# Patient Record
Sex: MALE | Race: WHITE | NOT HISPANIC OR LATINO | Employment: FULL TIME | URBAN - METROPOLITAN AREA
[De-identification: names, ages, dates, MRNs, and addresses within clinical notes are randomized per-mention and may not be internally consistent; named-entity substitution may affect disease eponyms.]

---

## 2017-12-04 DIAGNOSIS — E34.9 HYPOTESTOSTERONISM: ICD-10-CM

## 2017-12-04 RX ORDER — TESTOSTERONE CYPIONATE 1000 MG/10ML
75 INJECTION, SOLUTION INTRAMUSCULAR WEEKLY
Qty: 1 VIAL | Refills: 0 | Status: SHIPPED | OUTPATIENT
Start: 2017-12-04 | End: 2018-02-15

## 2017-12-04 NOTE — TELEPHONE ENCOUNTER
Routing refill request to provider for review/approval because:  Drug not on the [facility] refill protocol

## 2017-12-04 NOTE — TELEPHONE ENCOUNTER
Rx for testosterone faxed to [facility] in [place],message left for patient to return call. Last SIRISHA was on 11/28/16.

## 2018-02-15 ENCOUNTER — OFFICE VISIT (OUTPATIENT)
Dept: FAMILY MEDICINE | Facility: CLINIC | Age: 43
End: 2018-02-15
Payer: COMMERCIAL

## 2018-02-15 VITALS
HEART RATE: 66 BPM | SYSTOLIC BLOOD PRESSURE: 132 MMHG | HEIGHT: 67 IN | OXYGEN SATURATION: 97 % | WEIGHT: 203 LBS | DIASTOLIC BLOOD PRESSURE: 88 MMHG | RESPIRATION RATE: 16 BRPM | BODY MASS INDEX: 31.86 KG/M2

## 2018-02-15 DIAGNOSIS — Z00.01 ENCOUNTER FOR ROUTINE ADULT MEDICAL EXAM WITH ABNORMAL FINDINGS: Primary | ICD-10-CM

## 2018-02-15 DIAGNOSIS — E78.5 HYPERLIPIDEMIA LDL GOAL <130: ICD-10-CM

## 2018-02-15 DIAGNOSIS — E34.9 HYPOTESTOSTERONISM: ICD-10-CM

## 2018-02-15 DIAGNOSIS — Z92.241 HISTORY OF ANABOLIC STEROID USE: ICD-10-CM

## 2018-02-15 LAB
CHOLEST SERPL-MCNC: 199 MG/DL
HDLC SERPL-MCNC: 30 MG/DL
LDLC SERPL CALC-MCNC: 135 MG/DL
NONHDLC SERPL-MCNC: 169 MG/DL
TRIGL SERPL-MCNC: 168 MG/DL

## 2018-02-15 PROCEDURE — 84403 ASSAY OF TOTAL TESTOSTERONE: CPT | Performed by: PHYSICIAN ASSISTANT

## 2018-02-15 PROCEDURE — 80061 LIPID PANEL: CPT | Performed by: PHYSICIAN ASSISTANT

## 2018-02-15 PROCEDURE — 99396 PREV VISIT EST AGE 40-64: CPT | Performed by: PHYSICIAN ASSISTANT

## 2018-02-15 PROCEDURE — 36415 COLL VENOUS BLD VENIPUNCTURE: CPT | Performed by: PHYSICIAN ASSISTANT

## 2018-02-15 PROCEDURE — 84270 ASSAY OF SEX HORMONE GLOBUL: CPT | Performed by: PHYSICIAN ASSISTANT

## 2018-02-15 RX ORDER — TESTOSTERONE CYPIONATE 1000 MG/10ML
75 INJECTION, SOLUTION INTRAMUSCULAR WEEKLY
Qty: 1 VIAL | Refills: 3 | Status: SHIPPED | OUTPATIENT
Start: 2018-02-15 | End: 2018-02-18

## 2018-02-15 NOTE — MR AVS SNAPSHOT
After Visit Summary   2/15/2018    Neville Magallanes    MRN: [id number]           Patient Information     Date Of Birth          1975        Visit Information        Provider Department      2/15/2018 8:40 AM Haile Tam PA-C [facility]        Today's Diagnoses     Encounter for routine adult medical exam with abnormal findings    -  1    Hyperlipidemia LDL goal <130        Hypotestosteronism        History of anabolic steroid use          Care Instructions      Preventive Health Recommendations  Male Ages 40 to 49    Yearly exam:             See your health care provider every year in order to  o   Review health changes.   o   Discuss preventive care.    o   Review your medicines if your doctor has prescribed any.    You should be tested each year for STDs (sexually transmitted diseases) if you re at risk.     Have a cholesterol test every 5 years.     Have a colonoscopy (test for colon cancer) if someone in your family has had colon cancer or polyps before age 50.     After age 45, have a diabetes test (fasting glucose). If you are at risk for diabetes, you should have this test every 3 years.      Talk with your health care provider about whether or not a prostate cancer screening test (PSA) is right for you.    Shots: Get a flu shot each year. Get a tetanus shot every 10 years.     Nutrition:    Eat at least 5 servings of fruits and vegetables daily.     Eat whole-grain bread, whole-wheat pasta and brown rice instead of white grains and rice.     Talk to your provider about Calcium and Vitamin D.     Lifestyle    Exercise for at least 150 minutes a week (30 minutes a day, 5 days a week). This will help you control your weight and prevent disease.     Limit alcohol to one drink per day.     No smoking.     Wear sunscreen to prevent skin cancer.     See your dentist every six months for an exam and cleaning.              Follow-ups after your visit        Who to contact     Normal  "or non-critical lab and imaging results will be communicated to you by Celsensehart, letter or phone within 4 business days after the clinic has received the results. If you do not hear from us within 7 days, please contact the clinic through Smart[employer] or phone. If you have a critical or abnormal lab result, we will notify you by phone as soon as possible.  Submit refill requests through [employer] or call your pharmacy and they will forward the refill request to us. Please allow 3 business days for your refill to be completed.          If you need to speak with a  for additional information , please call: [phone]             Additional Information About Your Visit        [employer] Information     [employer] gives you secure access to your electronic health record. If you see a primary care provider, you can also send messages to your care team and make appointments. If you have questions, please call your primary care clinic.  If you do not have a primary care provider, please call [phone] and they will assist you.        Care EveryWhere ID     This is your Care EveryWhere ID. This could be used by other organizations to access your [place] medical records  [id number]        Your Vitals Were     Pulse Respirations Height Pulse Oximetry BMI (Body Mass Index)       66 16 5' 7\" (1.702 m) 97% 31.79 kg/m2        Blood Pressure from Last 3 Encounters:   02/15/18 132/88   12/07/16 126/78   11/28/16 137/87    Weight from Last 3 Encounters:   02/15/18 203 lb (92.1 kg)   12/07/16 194 lb 3.2 oz (88.1 kg)   11/28/16 205 lb (93 kg)              We Performed the Following     Lipid panel reflex to direct LDL Fasting     Testosterone Free and Total          Today's Medication Changes          These changes are accurate as of 2/15/18  9:43 AM.  If you have any questions, ask your nurse or doctor.               Stop taking these medicines if you haven't already. Please contact your care team if you have questions.  "    RA NICOTINE GUM MT   Stopped by:  Haile Tam PA-C           simvastatin 40 MG tablet   Commonly known as:  ZOCOR   Stopped by:  Haile Tam PA-C           valACYclovir 1000 mg tablet   Commonly known as:  VALTREX   Stopped by:  Haile Tam PA-C                Where to get your medicines      Some of these will need a paper prescription and others can be bought over the counter.  Ask your nurse if you have questions.     Bring a paper prescription for each of these medications     testosterone cypionate 100 MG/ML Soln inj                Primary Care Provider Office Phone # Fax #    Haile Tam PA-C [phone]       [address] [place] [address]        Equal Access to Services     [facility] FORD : Hadii aad ku hadasho Sokelleali, waaxda luqadaha, qaybta kaalmada adeegyada, waxay shell frias . So [facility] [phone].    ATENCIÓN: Si habla español, tiene a watson disposición servicios gratuitos de asistencia lingüística. [facility] [phone].    We comply with applicable federal civil rights laws and [place] laws. We do not discriminate on the basis of race, color, national origin, age, disability, sex, sexual orientation, or gender identity.            Thank you!     Thank you for choosing [facility]  for your care. Our goal is always to provide you with excellent care. Hearing back from our patients is one way we can continue to improve our services. Please take a few minutes to complete the written survey that you may receive in the mail after your visit with us. Thank you!             Your Updated Medication List - Protect others around you: Learn how to safely use, store and throw away your medicines at www.disposemymeds.org.          This list is accurate as of 2/15/18  9:43 AM.  Always use your most recent med list.                   Brand Name Dispense Instructions for use Diagnosis    testosterone cypionate 100 MG/ML Soln inj      1 vial    Inject 0.75 mLs (75 mg) into the muscle once a week    Hypotestosteronism

## 2018-02-15 NOTE — LETTER
[facility]  [address] [place] [address]  Phone: [phone]    February 15, 2018        Neville Magallanes  [address] [place] [address]          To whom it may concern:    RE: Neville Magallanes    Patient was seen and treated today at our clinic. I am recommending he utilize his flex spending and HSA dollars for a health club membership and martial arts training to assist in stress management as well as cardiac risk factor management. Additionally, I'd like him to use the money to purchase protein supplements to help reduce cholesterol and enhance lean tissue development. lastly, I have recommended chiropractic treatments and massage therapy for stress management.    Please contact me for questions or concerns.      Sincerely,        Haile Tam PA-C

## 2018-02-15 NOTE — PROGRESS NOTES
SUBJECTIVE:   CC: Neville Magallanes is an 42 year old male who presents for preventative health visit.     Healthy Habits:    Do you get at least three servings of calcium containing foods daily (dairy, green leafy vegetables, etc.)? yes    Amount of exercise or daily activities, outside of work: 7 day(s) per week    Problems taking medications regularly No    Medication side effects: No    Have you had an eye exam in the past two years? yes    Do you see a dentist twice per year? yes    Do you have sleep apnea, excessive snoring or daytime drowsiness?no           Today's PHQ-2 Score: No flowsheet data found.    Abuse: Current or Past(Physical, Sexual or Emotional)- No  Do you feel safe in your environment - Yes    Social History   Substance Use Topics     Smoking status: Former Smoker     Smokeless tobacco: Never Used     Alcohol use 0.0 oz/week     0 Standard drinks or equivalent per week      Comment: social      If you drink alcohol do you typically have >3 drinks per day or >7 drinks per week? No                      Last PSA: No results found for: PSA    Reviewed orders with patient. Reviewed health maintenance and updated orders accordingly - Yes  BP Readings from Last 3 Encounters:   02/15/18 132/88   12/07/16 126/78   11/28/16 137/87    Wt Readings from Last 3 Encounters:   02/15/18 203 lb (92.1 kg)   12/07/16 194 lb 3.2 oz (88.1 kg)   11/28/16 205 lb (93 kg)                    Reviewed and updated as needed this visit by clinical staff  Tobacco  Allergies  Meds  Problems         Reviewed and updated as needed this visit by Provider  Allergies  Meds  Problems        No past medical history on file.   Past Surgical History:   Procedure Laterality Date     ARTHROSCOPIC RECONSTRUCTION ANTERIOR CRUCIATE LIGAMENT Left 5/5/2016    Procedure: ARTHROSCOPIC RECONSTRUCTION ANTERIOR CRUCIATE LIGAMENT;  Surgeon: Casey Egan MD;  Location: [facility] OR       ROS:  C: NEGATIVE for fever, chills, change in weight  I:  "NEGATIVE for worrisome rashes, moles or lesions  E: NEGATIVE for vision changes or irritation  ENT: NEGATIVE for ear, mouth and throat problems  R: NEGATIVE for significant cough or SOB  CV: NEGATIVE for chest pain, palpitations or peripheral edema  GI: NEGATIVE for nausea, abdominal pain, heartburn, or change in bowel habits   male: negative for dysuria, hematuria, decreased urinary stream, erectile dysfunction, urethral discharge  M: NEGATIVE for significant arthralgias or myalgia  N: NEGATIVE for weakness, dizziness or paresthesias  P: NEGATIVE for changes in mood or affect    OBJECTIVE:   /88  Pulse 66  Resp 16  Ht 5' 7\" (1.702 m)  Wt 203 lb (92.1 kg)  SpO2 97%  BMI 31.79 kg/m2  EXAM:  GENERAL: healthy, alert and no distress  EYES: Eyes grossly normal to inspection, PERRL and conjunctivae and sclerae normal  HENT: ear canals and TM's normal, nose and mouth without ulcers or lesions  NECK: no adenopathy, no asymmetry, masses, or scars and thyroid normal to palpation  RESP: lungs clear to auscultation - no rales, rhonchi or wheezes  CV: regular rate and rhythm, normal S1 S2, no S3 or S4, no murmur, click or rub, no peripheral edema and peripheral pulses strong  ABDOMEN: soft, nontender, no hepatosplenomegaly, no masses and bowel sounds normal  MS: no gross musculoskeletal defects noted, no edema  SKIN: no suspicious lesions or rashes  NEURO: Normal strength and tone, mentation intact and speech normal  PSYCH: mentation appears normal, affect normal/bright    ASSESSMENT/PLAN:       ICD-10-CM    1. Encounter for routine adult medical exam with abnormal findings Z00.01 CANCELED: GLUCOSE   2. Hyperlipidemia LDL goal <130 E78.5 Lipid panel reflex to direct LDL Fasting   3. Hypotestosteronism E34.9 testosterone cypionate 100 MG/ML SOLN inj     Testosterone Free and Total   4. History of anabolic steroid use Z92.241        COUNSELING:  Reviewed preventive health counseling, as reflected in patient " "instructions       Regular exercise       Healthy diet/nutrition       reports that he has quit smoking. He has never used smokeless tobacco.    Estimated body mass index is 31.79 kg/(m^2) as calculated from the following:    Height as of this encounter: 5' 7\" (1.702 m).    Weight as of this encounter: 203 lb (92.1 kg).       Counseling Resources:  ATP IV Guidelines  Pooled Cohorts Equation Calculator  FRAX Risk Assessment  ICSI Preventive Guidelines  Dietary Guidelines for Americans, 2010  USDA's MyPlate  ASA Prophylaxis  Lung CA Screening    Haile Tam PA-C  [facility]  "

## 2018-02-16 LAB
SHBG SERPL-SCNC: 50 NMOL/L (ref 11–80)
TESTOST FREE SERPL-MCNC: 42.85 NG/DL (ref 4.7–24.4)
TESTOST SERPL-MCNC: 1867 NG/DL (ref 240–950)

## 2018-02-18 DIAGNOSIS — E34.9 HYPOTESTOSTERONISM: ICD-10-CM

## 2018-02-19 NOTE — TELEPHONE ENCOUNTER
Requested Prescriptions   Pending Prescriptions Disp Refills     testosterone cypionate 100 MG/ML SOLN inj [Pharmacy Med Name: Testosterone Cypionate Intramuscular Solution 100 MG/ML] 10 mL 0     Sig: INJECT 0.75 MLS (75 MGS.) INTO THE MUSCLE ONCE A WEEK    There is no refill protocol information for this order      Last Written Prescription Date:  12-4-17  Last Fill Quantity: 10,  # refills: 0   Last office visit: 2/15/2018 with prescribing provider:  2-15-18   Future Office Visit:

## 2018-02-20 RX ORDER — TESTOSTERONE CYPIONATE 1000 MG/10ML
INJECTION, SOLUTION INTRAMUSCULAR
Qty: 10 ML | Refills: 0 | Status: SHIPPED | OUTPATIENT
Start: 2018-02-20 | End: 2018-12-10

## 2018-12-10 DIAGNOSIS — E34.9 HYPOTESTOSTERONISM: ICD-10-CM

## 2018-12-10 NOTE — TELEPHONE ENCOUNTER
Requested Prescriptions   Pending Prescriptions Disp Refills     testosterone cypionate (DEPOTESTOSTERONE) 100 MG/ML injection [Pharmacy Med Name: Testosterone Cypionate Intramuscular Solution 100 MG/ML] 10 mL 2    Last Written Prescription Date:  7-6-18  Last Fill Quantity: 10,  # refills: 2   Last office visit: 2/15/2018 with prescribing provider:  2-15-18   Future Office Visit:   Sig: INJECT 0.75 MLS (75 MG) INTO THE MUSCLE ONCE A WEEK    Androgen Agents Failed - 12/10/2018 11:54 AM       Failed - ALT on file within past 12 mos    Recent Labs   Lab Test 12/07/16  0938   ALT 50            Failed - HCT less than 54% on file within past 12 mos    No lab results found.         Failed - Serum PSA on file within past 12 mos    No results found for: PSA         Failed - Refills for this classification require provider review       Failed - Blood pressure under 140/90 in past 6 months    BP Readings from Last 3 Encounters:   02/15/18 132/88   12/07/16 126/78   11/28/16 137/87                Failed - Recent (6 mo) or future (30 days) visit within the authorizing provider's specialty       Failed - AST on file within past 12 mos    Recent Labs   Lab Test 12/07/16  0938   AST 31            Passed - Patient is of age 12 and older       Passed - Lipid panel on file in past 12 mos    Recent Labs   Lab Test 02/15/18  0947   CHOL 199   TRIG 168*   HDL 30*   *   NHDL 169*              Passed - Medication is active on med list       Passed - Serum Testosterone on file within past 12 mos    Recent Labs   Lab Test 02/15/18  0947   TESTOSTTOTAL 1,867*            Passed - Patient is not pregnant       Passed - No positive pregnancy test on file within past 12 mos

## 2018-12-12 RX ORDER — TESTOSTERONE CYPIONATE 1000 MG/10ML
INJECTION, SOLUTION INTRAMUSCULAR
Qty: 10 ML | Refills: 2 | Status: SHIPPED | OUTPATIENT
Start: 2018-12-12 | End: 2019-10-24

## 2019-02-04 ENCOUNTER — OFFICE VISIT (OUTPATIENT)
Dept: ORTHOPEDICS | Facility: CLINIC | Age: 44
End: 2019-02-04
Payer: COMMERCIAL

## 2019-02-04 VITALS
HEIGHT: 68 IN | OXYGEN SATURATION: 95 % | WEIGHT: 211 LBS | RESPIRATION RATE: 16 BRPM | SYSTOLIC BLOOD PRESSURE: 143 MMHG | DIASTOLIC BLOOD PRESSURE: 90 MMHG | BODY MASS INDEX: 31.98 KG/M2 | HEART RATE: 93 BPM

## 2019-02-04 DIAGNOSIS — M25.561 ACUTE PAIN OF RIGHT KNEE: Primary | ICD-10-CM

## 2019-02-04 PROCEDURE — 99214 OFFICE O/P EST MOD 30 MIN: CPT | Performed by: ORTHOPAEDIC SURGERY

## 2019-02-04 ASSESSMENT — PAIN SCALES - GENERAL: PAINLEVEL: MILD PAIN (3)

## 2019-02-04 ASSESSMENT — MIFFLIN-ST. JEOR: SCORE: 1808.65

## 2019-02-04 NOTE — LETTER
"    2/4/2019         RE: Neville Magallanes  [address]  [place] [address]        Dear Colleague,    Thank you for referring your patient, Neville Magallanes, to the [facility]. Please see a copy of my visit note below.    CHIEF COMPLAINT:   Chief Complaint   Patient presents with     Knee right     Right knee pain since Nov. 2018 doing Martial Arts. 2 weeks ago he was at [employer] and the knee slip out of socket. His knee keep popping and feels instability.Symptoms are worse with activity, stairs.Treatments: none    .        HISTORY OF PRESENT ILLNESS    Neville Magallanes is a 43 year old male seen for evaluation of ongoing right knee pain with a possible injury. Back in November 2018 he was doing some martial arts training with another person, whom had their legs wrapped around him. He states he felt a \"pop\" in his knee, not much pain, slight discomfort. He didn't notice any swelling. Then 2 weeks ago was at a [employer] park and felt his right knee \"give out\" causing him to go down. Onset of pain, swelling. Now his knee is stiff in the morning, tight/sensitive in the back of the knee. Feels like it loosens up with increased activities. No other episodes of his knee giving way. No treatment.    History of prior left knee anterior cruciate ligament 5/2016.    Present symptoms: pain posteriorly, pain dull/achy, tightness , mild pain, mild swelling, \"pop\" and an episode of giving way.    Pain severity: 3/10  Frequency of symptoms: occasionally  Exacerbating Factors: prolonged sitting  Relieving Factors: movement  Night Pain: Yes  Pain while at rest: Yes   Numbness or tingling: No   Patient has tried:     NSAIDS: No      Physical Therapy: No      Activity modification: Yes      Bracing: Yes      Injections: No     Ice: No      Assistive device:  No    Orthopaedic PMH: left knee anterior cruciate ligament reconstruction with hamstrings autograft 5/2016.    Other PMH:  has no past medical history " "on file.  Patient Active Problem List   Diagnosis     History of anabolic steroid use     Hypotestosteronism     Hyperlipidemia LDL goal <130       Surgical Hx:  has a past surgical history that includes Arthroscopic reconstruction anterior cruciate ligament (Left, 5/5/2016).    Medications:   Current Outpatient Medications:      testosterone cypionate (DEPOTESTOSTERONE) 100 MG/ML injection, INJECT 0.75 MLS (75 MG) INTO THE MUSCLE ONCE A WEEK, Disp: 10 mL, Rfl: 2    Allergies: No Known Allergies    Social Hx: manager.   reports that he has quit smoking. he has never used smokeless tobacco. He reports that he drinks alcohol. He reports that he does not use drugs.    Family Hx: family history includes Hypertension in his maternal grandfather.    REVIEW OF SYSTEMS: 10 point ROS neg other than the symptoms noted above in the HPI and PMH. Notables include  CONSTITUTIONAL:NEGATIVE for fever, chills, change in weight  INTEGUMENTARY/SKIN: NEGATIVE for worrisome rashes, moles or lesions  MUSCULOSKELETAL:See HPI above  NEURO: NEGATIVE for weakness, dizziness or paresthesias    PHYSICAL EXAM:  /90   Pulse 93   Resp 16   Ht 1.715 m (5' 7.5\")   Wt 95.7 kg (211 lb)   SpO2 95%   BMI 32.56 kg/m      GENERAL APPEARANCE: healthy, alert, no distress  SKIN: no suspicious lesions or rashes  NEURO: Normal strength and tone, mentation intact and speech normal  PSYCH:  mentation appears normal and affect normal, not anxious  RESPIRATORY: No increased work of breathing.  HANDS: no clubbing, nail pitting  LYMPH: no palpable popliteal lymphadenopathy.    BILATERAL LOWER EXTREMITIES:  Gait: normal  Alignment: varus  No gross deformities or masses.  No Quad atrophy, strength normal.  Intact sensation deep peroneal nerve, superficial peroneal nerve, med/lat tibial nerve, sural nerve, saphenous nerve  Intact EHL, EDL, TA, FHL, GS, quadriceps hamstrings and hip flexors  Toes warm and well perfused, brisk capillary refill. Palpable 2+ " dp pulses.  Bilateral calf soft and nttp or squeeze.  DTRs: achilles 2+, patella 2+.  Edema: none    LEFT KNEE EXAM:    Skin: intact, no ecchymosis or erythema; surgical scars noted.  Squat: 100 %, not limited by pain.     ROM: 0 extension to 130+ flexion  Tight hamstrings on straight leg raise.  Effusion: none  Tender: NTTP med/lat joint line, anterior or posterior knee  McMurrays: negative    Varus/valgus laxity with endpiont  Lachmans: 2A, firm endpoint  Posterior Drawer stable  Patellofemoral joint:                Apprehension: negative              Crepitations: minimal    RIGHT KNEE EXAM:    Skin: intact, no ecchymosis or erythema  Exam somewhat limited by guarding  Squat: 100 %, not limited by pain.    ROM: 0 extension to 130 flexion  Tight hamstrings on straight leg raise.  Effusion: trace  Tender: medial joint line, popliteal fossa  NTTP lat joint line, anterior knee  McMurrays: negative    Varus/valgus laxity with endpoint  Lachmans: grade 2 equivocal endpoint  Posterior Drawer stable  Patellofemoral joint:                Apprehension: negative              Crepitations: minimal    X-RAY:  patient declines today.       ASSESSMENT/PLAN: Neville Magallanes is a 43 year old male with acute left knee pain following injury.     * exam somewhat limited by guarding  * concern for meniscal or ligamentous injury  * recommend MRI for further evaluation  * activity modification, rest, ice, nsaids, knee brace  * will call with MRI results, treatment options.  * return to clinic to be determined.    Patient to follow up with Primary Care provider regarding elevated blood pressure.    Caesy Egan M.D., M.S.  Dept. of Orthopaedic Surgery  [facility]        Again, thank you for allowing me to participate in the care of your patient.        Sincerely,        Casey Egan MD

## 2019-02-04 NOTE — PROGRESS NOTES
"CHIEF COMPLAINT:   Chief Complaint   Patient presents with     Knee right     Right knee pain since Nov. 2018 doing Martial Arts. 2 weeks ago he was at [employer] park and the knee slip out of socket. His knee keep popping and feels instability.Symptoms are worse with activity, stairs.Treatments: none    .        HISTORY OF PRESENT ILLNESS    Neville Magallanes is a 43 year old male seen for evaluation of ongoing right knee pain with a possible injury. Back in November 2018 he was doing some martial arts training with another person, whom had their legs wrapped around him. He states he felt a \"pop\" in his knee, not much pain, slight discomfort. He didn't notice any swelling. Then 2 weeks ago was at a [employer] park and felt his right knee \"give out\" causing him to go down. Onset of pain, swelling. Now his knee is stiff in the morning, tight/sensitive in the back of the knee. Feels like it loosens up with increased activities. No other episodes of his knee giving way. No treatment.    History of prior left knee anterior cruciate ligament 5/2016.    Present symptoms: pain posteriorly, pain dull/achy, tightness , mild pain, mild swelling, \"pop\" and an episode of giving way.    Pain severity: 3/10  Frequency of symptoms: occasionally  Exacerbating Factors: prolonged sitting  Relieving Factors: movement  Night Pain: Yes  Pain while at rest: Yes   Numbness or tingling: No   Patient has tried:     NSAIDS: No      Physical Therapy: No      Activity modification: Yes      Bracing: Yes      Injections: No     Ice: No      Assistive device:  No    Orthopaedic PMH: left knee anterior cruciate ligament reconstruction with hamstrings autograft 5/2016.    Other PMH:  has no past medical history on file.  Patient Active Problem List   Diagnosis     History of anabolic steroid use     Hypotestosteronism     Hyperlipidemia LDL goal <130       Surgical Hx:  has a past surgical history that includes Arthroscopic reconstruction anterior " "cruciate ligament (Left, 5/5/2016).    Medications:   Current Outpatient Medications:      testosterone cypionate (DEPOTESTOSTERONE) 100 MG/ML injection, INJECT 0.75 MLS (75 MG) INTO THE MUSCLE ONCE A WEEK, Disp: 10 mL, Rfl: 2    Allergies: No Known Allergies    Social Hx: manager.   reports that he has quit smoking. he has never used smokeless tobacco. He reports that he drinks alcohol. He reports that he does not use drugs.    Family Hx: family history includes Hypertension in his maternal grandfather.    REVIEW OF SYSTEMS: 10 point ROS neg other than the symptoms noted above in the HPI and PMH. Notables include  CONSTITUTIONAL:NEGATIVE for fever, chills, change in weight  INTEGUMENTARY/SKIN: NEGATIVE for worrisome rashes, moles or lesions  MUSCULOSKELETAL:See HPI above  NEURO: NEGATIVE for weakness, dizziness or paresthesias    PHYSICAL EXAM:  /90   Pulse 93   Resp 16   Ht 1.715 m (5' 7.5\")   Wt 95.7 kg (211 lb)   SpO2 95%   BMI 32.56 kg/m     GENERAL APPEARANCE: healthy, alert, no distress  SKIN: no suspicious lesions or rashes  NEURO: Normal strength and tone, mentation intact and speech normal  PSYCH:  mentation appears normal and affect normal, not anxious  RESPIRATORY: No increased work of breathing.  HANDS: no clubbing, nail pitting  LYMPH: no palpable popliteal lymphadenopathy.    BILATERAL LOWER EXTREMITIES:  Gait: normal  Alignment: varus  No gross deformities or masses.  No Quad atrophy, strength normal.  Intact sensation deep peroneal nerve, superficial peroneal nerve, med/lat tibial nerve, sural nerve, saphenous nerve  Intact EHL, EDL, TA, FHL, GS, quadriceps hamstrings and hip flexors  Toes warm and well perfused, brisk capillary refill. Palpable 2+ dp pulses.  Bilateral calf soft and nttp or squeeze.  DTRs: achilles 2+, patella 2+.  Edema: none    LEFT KNEE EXAM:    Skin: intact, no ecchymosis or erythema; surgical scars noted.  Squat: 100 %, not limited by pain.     ROM: 0 extension to " 130+ flexion  Tight hamstrings on straight leg raise.  Effusion: none  Tender: NTTP med/lat joint line, anterior or posterior knee  McMurrays: negative    Varus/valgus laxity with endpiont  Lachmans: 2A, firm endpoint  Posterior Drawer stable  Patellofemoral joint:                Apprehension: negative              Crepitations: minimal    RIGHT KNEE EXAM:    Skin: intact, no ecchymosis or erythema  Exam somewhat limited by guarding  Squat: 100 %, not limited by pain.    ROM: 0 extension to 130 flexion  Tight hamstrings on straight leg raise.  Effusion: trace  Tender: medial joint line, popliteal fossa  NTTP lat joint line, anterior knee  McMurrays: negative    Varus/valgus laxity with endpoint  Lachmans: grade 2 equivocal endpoint  Posterior Drawer stable  Patellofemoral joint:                Apprehension: negative              Crepitations: minimal    X-RAY:  patient declines today.       ASSESSMENT/PLAN: Neville Magallanes is a 43 year old male with acute left knee pain following injury.     * exam somewhat limited by guarding  * concern for meniscal or ligamentous injury  * recommend MRI for further evaluation  * activity modification, rest, ice, nsaids, knee brace  * will call with MRI results, treatment options.  * return to clinic to be determined.    Patient to follow up with Primary Care provider regarding elevated blood pressure.    Casey Egan M.D., M.S.  Dept. of Orthopaedic Surgery  [facility]

## 2019-02-11 ENCOUNTER — ANCILLARY PROCEDURE (OUTPATIENT)
Dept: MRI IMAGING | Facility: CLINIC | Age: 44
End: 2019-02-11
Attending: ORTHOPAEDIC SURGERY
Payer: COMMERCIAL

## 2019-02-11 DIAGNOSIS — M25.561 ACUTE PAIN OF RIGHT KNEE: ICD-10-CM

## 2019-02-11 PROCEDURE — 73721 MRI JNT OF LWR EXTRE W/O DYE: CPT | Mod: TC

## 2019-10-24 DIAGNOSIS — E34.9 HYPOTESTOSTERONISM: ICD-10-CM

## 2019-10-24 NOTE — TELEPHONE ENCOUNTER
Testosterone      Last Written Prescription Date:  01/21/19  Last Fill Quantity: 10,   # refills: 2  Last Office Visit: 02/15/18  BETTY Tam  Future Office visit:       Routing refill request to provider for review/approval because:  Drug not on the [facility], [facility] or [facility] refill protocol or controlled substance

## 2019-10-26 RX ORDER — TESTOSTERONE CYPIONATE 1000 MG/10ML
INJECTION, SOLUTION INTRAMUSCULAR
Qty: 10 ML | Refills: 2 | Status: SHIPPED | OUTPATIENT
Start: 2019-10-26 | End: 2021-05-04

## 2020-03-10 ENCOUNTER — HEALTH MAINTENANCE LETTER (OUTPATIENT)
Age: 45
End: 2020-03-10

## 2020-12-27 ENCOUNTER — HEALTH MAINTENANCE LETTER (OUTPATIENT)
Age: 45
End: 2020-12-27

## 2021-04-24 ENCOUNTER — HEALTH MAINTENANCE LETTER (OUTPATIENT)
Age: 46
End: 2021-04-24

## 2021-04-28 NOTE — PATIENT INSTRUCTIONS
Preventive Health Recommendations  Male Ages 40 to 49    Yearly exam:             See your health care provider every year in order to  o   Review health changes.   o   Discuss preventive care.    o   Review your medicines if your doctor has prescribed any.    You should be tested each year for STDs (sexually transmitted diseases) if you re at risk.     Have a cholesterol test every 5 years.     Have a colonoscopy (test for colon cancer) if someone in your family has had colon cancer or polyps before age 50.     After age 45, have a diabetes test (fasting glucose). If you are at risk for diabetes, you should have this test every 3 years.      Talk with your health care provider about whether or not a prostate cancer screening test (PSA) is right for you.    Shots: Get a flu shot each year. Get a tetanus shot every 10 years.     Nutrition:    Eat at least 5 servings of fruits and vegetables daily.     Eat whole-grain bread, whole-wheat pasta and brown rice instead of white grains and rice.     Get adequate Calcium and Vitamin D.     Lifestyle    Exercise for at least 150 minutes a week (30 minutes a day, 5 days a week). This will help you control your weight and prevent disease.     Limit alcohol to one drink per day.     No smoking.     Wear sunscreen to prevent skin cancer.     See your dentist every six months for an exam and cleaning.      Patient Education     Checking Your Blood Pressure  Step-by-Step    [employer] last reviewed this educational content on 4/1/2020 2000-2021 [employer]. All rights reserved. This information is not intended as a substitute for professional medical care. Always follow your healthcare professional's instructions.           Patient Education     Taking Your Blood Pressure  Blood pressure is the force of blood against the artery wall as it moves from the heart through the blood vessels. You can take your own blood pressure reading using a digital monitor. Take your  readings the same each time, using the same arm. Take readings as often as your healthcare provider advises.  About blood pressure monitors  Blood pressure monitors are designed for certain ages and cases. You can find monitors for older adults, for pregnant women, and for children. Make sure the one you choose is the right one for your age and situation.  The American Heart Association advises an automatic cuff monitor that fits on your upper arm (bicep). The cuff should fit your arm size. A cuff that s too large or too small will not give an accurate reading. Measure around your upper arm to find your size.  Monitors that attach to your finger or wrist are not as accurate as monitors for your upper arm.  Ask your healthcare provider for help in choosing a monitor. Bring your monitor to your next provider visit if you need help in using it the correct way.  The steps below are general instructions for using an automatic digital monitor.  Step 1. Relax      Take your blood pressure at the same time every day, such as in the morning or evening. Or take it at the time your healthcare provider advises.    Wait at least30 minutes after smoking, eating, or exercising. Don't drink coffee, tea, soda, or other caffeinated drinks before checking your blood pressure. If needed, use the restroom beforehand.    Sit comfortably at a table with both feet on the floor. Don't cross your legs or feet. Place the monitor near you.    Rest for a few minutes before you begin. Make sure there are no distractions. This includes TV, cell phones, and other electronics. Wait to have conversations with others until after you measure you blood pressure.  Step 2. Wrap the cuff      Place your arm on the table, palm up. Your arm should be at the level of your heart. Wrap the cuff around your upper arm, just above your elbow. It s best done on bare skin, not over clothing. Most cuffs will show you where the blood vessel in the middle of the arm  at the inner side of the elbow (the brachial artery) should line up with the cuff. Look in your monitor's instruction booklet for an illustration. You can also bring your cuff to your healthcare provider and have them show you how to correctly place the cuff.  Step 3. Inflate the cuff      Push the button that starts the pump.    The cuff will tighten, then loosen.    The numbers will change. When they stop changing, your blood pressure reading will appear.    Take 2 or 3 readings 1 minute apart.  Step 4. Write down the results of each reading      Write down your blood pressure numbers for each reading. Note the date and time. Keep your results in one place, such as a notebook. Even if your monitor has a built-in memory, keep a hard copy of the readings.    Remove the cuff from your arm. Turn off the machine.    Bring your blood pressure records with you to each healthcare provider visit.    If you start a new blood pressure medicine, note the day you started the new medicine. Also note the day if you change the dose of your medicine. Measure your blood pressure before your take your medicine. This information goes on your blood pressure recording sheet. This will help your healthcare provider check how well the medicine changes are working.    Ask your provider what numbers mean that you should call him or her. Also ask what numbers mean you should get help right away.  [employer] last reviewed this educational content on 7/1/2019 2000-2021 [employer]. All rights reserved. This information is not intended as a substitute for professional medical care. Always follow your healthcare professional's instructions.           Patient Education     Eating Heart-Healthy Food: Using the DASH Plan    Eating for your heart doesn t have to be hard or boring. You just need to know how to make healthier choices. The DASH eating plan has been developed to help you do just that. DASH stands for Dietary Approaches  to Stop Hypertension. It is a plan that has been proven to be healthier for your heart and to lower your risk for high blood pressure. It can also help lower your risk for cancer, heart disease, osteoporosis, and diabetes.  Choosing from each food group  Choose foods from each of the food groups below each day. Try to get the recommended number of servings for each food group. The serving numbers are based on a diet of 2,000 calories a day. Talk with your healthcare provider if you re not sure about your calorie needs. Along with getting the correct servings, the DASH plan also advises less than 2,300 mg of salt (sodium) per day. Lowering sodium intake to 1,500 mg per day lowers blood pressure even more. (There's about 2,300 mg of sodium in 1 teaspoon of salt.)      Grains  Servings: 6 to 8 a day  A serving is:    1 slice bread    1 ounce dry cereal    Half a cup cooked rice, pasta or cereal  Best choices: Whole grains and any grains high in fiber. Vegetables  Servings: 4 to 5 a day  A serving is:    1 cup raw leafy vegetable    Half a cup cut-up raw or cooked vegetable    Half a cup vegetable juice  Best choices: Fresh or frozen vegetables prepared without added salt or fat.   Fruits  Servings: 4 to 5 a day  A serving is:    1 medium fruit    One-quarter cup dried fruit    Half a cup fresh, frozen, or canned fruit    Half a cup of 100% fruit juices  Best choices: A variety of fresh fruits of different colors. Whole fruits are a better choice than fruit juices. Low-fat or fat-free dairy  Servings: 2 to 3 a day  A serving is:    1 cup milk    1 cup yogurt    One and a half ounces cheese  Best choices: Skim or 1% milk, low-fat or fat-free yogurt or buttermilk, and low-fat cheeses.         Lean meats, poultry, fish  Servings: 6 or fewer a day  A serving is:    1 ounce cooked meats, poultry, or fish    1 egg  Best choices: Lean poultry and fish. Trim away visible fat. Broil, grill, roast, or boil instead of frying.  Remove skin from poultry before eating. Limit how much red meat you eat.  Nuts, seeds, beans  Servings: 4 to 5 a week  A serving is:    One-third cup nuts (one and a half ounces)    2 tablespoons nut butter or seeds    Half a cup cooked dry beans or legumes  Best choices: Dry roasted nuts with no salt added, lentils, kidney beans, garbanzo beans, and whole orta beans.   Fats and oils  Servings: 2 to 3 a day  A serving is:    1 teaspoon vegetable oil    1 teaspoon soft margarine    1 tablespoon mayonnaise    2 tablespoons salad dressing  Best choices: Nut and vegetable oils (nontropical vegetable oils), such as olive and canola oil. Sweets  Servings: 5 a week or fewer  A serving is:    1 tablespoon sugar, maple syrup, or honey    1 tablespoon jam or jelly    1 half-ounce jelly beans (about 15)    1 cup lemonade  Best choices: Dried fruit can be a satisfying sweet. Choose low-fat sweets. And watch your serving sizes!      For more on the DASH eating plan, visit:  www.nhlbi.nih.gov/health/health-topics/topics/dash   [employer] last reviewed this educational content on 7/1/2019 2000-2021 [employer]. All rights reserved. This information is not intended as a substitute for professional medical care. Always follow your healthcare professional's instructions.

## 2021-04-28 NOTE — PROGRESS NOTES
"  3  SUBJECTIVE:   CC: Neville Magallanes is an 45 year old male who presents for preventive health visit.     {Split Bill scripting  The purpose of this visit is to discuss your medical history and prevent health problems before you are sick. You may be responsible for a co-pay, coinsurance, or deductible if your visit today includes services such as checking on a sore throat, having an x-ray or lab test, or treating and evaluating a new or existing condition :003359}  Patient has been advised of split billing requirements and indicates understanding: {Yes and No:399013}   Patient would still like to discuss the following concern(s):  1. ***  2. ***  3. ***      Healthy Habits:    Do you get at least three servings of calcium containing foods daily (dairy, green leafy vegetables, etc.)? { :835703::\"yes\"}    Amount of exercise or daily activities, outside of work: { :378367}    Problems taking medications regularly { :829050::\"No\"}    Medication side effects: { :574253::\"No\"}    Have you had an eye exam in the past two years? { :970801}    Do you see a dentist twice per year? { :158449}    Do you have sleep apnea, excessive snoring or daytime drowsiness?{ :716767}  {Outside tests to abstract? :230739}    {additional problems to add (Optional):126927}    Today's PHQ-2 Score: No flowsheet data found.  {PHQ-2 LOOK IN ASSESSMENTS (Optional) :518975}  Abuse: Current or Past(Physical, Sexual or Emotional)- {YES/NO/NA:152526}  Do you feel safe in your environment? {YES/NO/NA:556220}    Have you ever done Advance Care Planning? (For example, a Health Directive, POLST, or a discussion with a medical provider or your loved ones about your wishes): { :419387}    Social History     Tobacco Use     Smoking status: Former Smoker     Smokeless tobacco: Never Used   Substance Use Topics     Alcohol use: Yes     Alcohol/week: 0.0 standard drinks     Comment: social     If you drink alcohol do you typically have >3 drinks per day or >7 " "drinks per week? {ETOH :854133}                      Last PSA: No results found for: PSA    Reviewed orders with patient. Reviewed health maintenance and updated orders accordingly - {Yes/No:004739::\"Yes\"}  {Chronicprobdata (Optional):859337}    Reviewed and updated as needed this visit by clinical staff                 Reviewed and updated as needed this visit by Provider                {HISTORY OPTIONS (Optional):570072}    ROS:  { :520640::\"CONSTITUTIONAL: NEGATIVE for fever, chills, change in weight\",\"INTEGUMENTARY/SKIN: NEGATIVE for worrisome rashes, moles or lesions\",\"EYES: NEGATIVE for vision changes or irritation\",\"ENT: NEGATIVE for ear, mouth and throat problems\",\"RESP: NEGATIVE for significant cough or SOB\",\"CV: NEGATIVE for chest pain, palpitations or peripheral edema\",\"GI: NEGATIVE for nausea, abdominal pain, heartburn, or change in bowel habits\",\" male: negative for dysuria, hematuria, decreased urinary stream, erectile dysfunction, urethral discharge\",\"MUSCULOSKELETAL: NEGATIVE for significant arthralgias or myalgia\",\"NEURO: NEGATIVE for weakness, dizziness or paresthesias\",\"PSYCHIATRIC: NEGATIVE for changes in mood or affect\"}    OBJECTIVE:   There were no vitals taken for this visit.  EXAM:  {Exam Choices:317916}    {Diagnostic Test Results (Optional):895800::\"Diagnostic Test Results:\",\"Labs reviewed in Epic\"}    ASSESSMENT/PLAN:   {Diag Picklist:253607}    Patient has been advised of split billing requirements and indicates understanding: {YES / NO:013344::\"Yes\"}  COUNSELING:  {MALE COUNSELING MESSAGES:282907::\"Reviewed preventive health counseling, as reflected in patient instructions\"}    Estimated body mass index is 32.56 kg/m  as calculated from the following:    Height as of 2/4/19: 1.715 m (5' 7.5\").    Weight as of 2/4/19: 95.7 kg (211 lb).    {Weight Management Plan (ACO) Complete if BMI is abnormal-  Ages 18-64  BMI >24.9.  Age 65+ with BMI <23 or >30 (Optional):190046}    He reports that " he has quit smoking. He has never used smokeless tobacco.      Counseling Resources:  ATP IV Guidelines  Pooled Cohorts Equation Calculator  FRAX Risk Assessment  ICSI Preventive Guidelines  Dietary Guidelines for Americans, 2010  USDA's MyPlate  ASA Prophylaxis  Lung CA Screening    Shaylee Kent NP  [facility]

## 2021-04-30 ASSESSMENT — ENCOUNTER SYMPTOMS
ABDOMINAL PAIN: 0
ARTHRALGIAS: 0
HEARTBURN: 0
COUGH: 0
SORE THROAT: 0
NAUSEA: 0
FEVER: 0
EYE PAIN: 0
HEMATOCHEZIA: 0
NERVOUS/ANXIOUS: 1
CONSTIPATION: 0
SHORTNESS OF BREATH: 0
DIZZINESS: 0
HEMATURIA: 0
FREQUENCY: 0
PALPITATIONS: 0
MYALGIAS: 0
DIARRHEA: 0
PARESTHESIAS: 0
WEAKNESS: 0
CHILLS: 0
JOINT SWELLING: 0
HEADACHES: 0
DYSURIA: 0

## 2021-05-04 ENCOUNTER — OFFICE VISIT (OUTPATIENT)
Dept: FAMILY MEDICINE | Facility: CLINIC | Age: 46
End: 2021-05-04
Payer: COMMERCIAL

## 2021-05-04 VITALS
DIASTOLIC BLOOD PRESSURE: 99 MMHG | SYSTOLIC BLOOD PRESSURE: 147 MMHG | HEIGHT: 67 IN | WEIGHT: 202.8 LBS | BODY MASS INDEX: 31.83 KG/M2 | OXYGEN SATURATION: 97 % | RESPIRATION RATE: 16 BRPM | HEART RATE: 83 BPM | TEMPERATURE: 97.1 F

## 2021-05-04 DIAGNOSIS — Z12.5 SCREENING FOR PROSTATE CANCER: ICD-10-CM

## 2021-05-04 DIAGNOSIS — Z11.59 ENCOUNTER FOR HEPATITIS C SCREENING TEST FOR LOW RISK PATIENT: ICD-10-CM

## 2021-05-04 DIAGNOSIS — Z13.1 SCREENING FOR DIABETES MELLITUS: ICD-10-CM

## 2021-05-04 DIAGNOSIS — Z71.89 ADVANCE CARE PLANNING: ICD-10-CM

## 2021-05-04 DIAGNOSIS — I10 UNCONTROLLED HYPERTENSION: ICD-10-CM

## 2021-05-04 DIAGNOSIS — Z13.220 LIPID SCREENING: ICD-10-CM

## 2021-05-04 DIAGNOSIS — E34.9 HYPOTESTOSTERONISM: ICD-10-CM

## 2021-05-04 DIAGNOSIS — Z00.01 ENCOUNTER FOR GENERAL ADULT MEDICAL EXAMINATION WITH ABNORMAL FINDINGS: Primary | ICD-10-CM

## 2021-05-04 DIAGNOSIS — Z87.891 FORMER SMOKER: ICD-10-CM

## 2021-05-04 DIAGNOSIS — Z11.4 SCREENING FOR HUMAN IMMUNODEFICIENCY VIRUS WITHOUT PRESENCE OF RISK FACTORS: ICD-10-CM

## 2021-05-04 LAB
ANION GAP SERPL CALCULATED.3IONS-SCNC: 7 MMOL/L (ref 3–14)
BUN SERPL-MCNC: 22 MG/DL (ref 7–30)
CALCIUM SERPL-MCNC: 9.3 MG/DL (ref 8.5–10.1)
CHLORIDE SERPL-SCNC: 98 MMOL/L (ref 94–109)
CHOLEST SERPL-MCNC: 212 MG/DL
CO2 SERPL-SCNC: 29 MMOL/L (ref 20–32)
CREAT SERPL-MCNC: 1.27 MG/DL (ref 0.66–1.25)
GFR SERPL CREATININE-BSD FRML MDRD: 67 ML/MIN/{1.73_M2}
GLUCOSE SERPL-MCNC: 83 MG/DL (ref 70–99)
HDLC SERPL-MCNC: 33 MG/DL
LDLC SERPL CALC-MCNC: 144 MG/DL
NONHDLC SERPL-MCNC: 179 MG/DL
POTASSIUM SERPL-SCNC: 4.1 MMOL/L (ref 3.4–5.3)
PSA SERPL-ACNC: 1.06 UG/L (ref 0–4)
SODIUM SERPL-SCNC: 134 MMOL/L (ref 133–144)
TRIGL SERPL-MCNC: 177 MG/DL

## 2021-05-04 PROCEDURE — 86803 HEPATITIS C AB TEST: CPT | Performed by: NURSE PRACTITIONER

## 2021-05-04 PROCEDURE — 87389 HIV-1 AG W/HIV-1&-2 AB AG IA: CPT | Performed by: NURSE PRACTITIONER

## 2021-05-04 PROCEDURE — 84403 ASSAY OF TOTAL TESTOSTERONE: CPT | Mod: 90 | Performed by: NURSE PRACTITIONER

## 2021-05-04 PROCEDURE — 99386 PREV VISIT NEW AGE 40-64: CPT | Performed by: NURSE PRACTITIONER

## 2021-05-04 PROCEDURE — 80061 LIPID PANEL: CPT | Performed by: NURSE PRACTITIONER

## 2021-05-04 PROCEDURE — G0103 PSA SCREENING: HCPCS | Performed by: NURSE PRACTITIONER

## 2021-05-04 PROCEDURE — 80048 BASIC METABOLIC PNL TOTAL CA: CPT | Performed by: NURSE PRACTITIONER

## 2021-05-04 PROCEDURE — 99000 SPECIMEN HANDLING OFFICE-LAB: CPT | Performed by: NURSE PRACTITIONER

## 2021-05-04 PROCEDURE — 99213 OFFICE O/P EST LOW 20 MIN: CPT | Mod: 25 | Performed by: NURSE PRACTITIONER

## 2021-05-04 PROCEDURE — 36415 COLL VENOUS BLD VENIPUNCTURE: CPT | Performed by: NURSE PRACTITIONER

## 2021-05-04 RX ORDER — TESTOSTERONE CYPIONATE 1000 MG/10ML
INJECTION, SOLUTION INTRAMUSCULAR
Qty: 10 ML | Refills: 2 | Status: SHIPPED | OUTPATIENT
Start: 2021-05-04 | End: 2022-02-05

## 2021-05-04 RX ORDER — LISINOPRIL 10 MG/1
10 TABLET ORAL DAILY
Qty: 90 TABLET | Refills: 3 | Status: SHIPPED | OUTPATIENT
Start: 2021-05-04 | End: 2022-08-11

## 2021-05-04 ASSESSMENT — MIFFLIN-ST. JEOR: SCORE: 1758.64

## 2021-05-04 NOTE — PROGRESS NOTES
DME (Durable Medical Equipment) Orders and Documentation  Orders Placed This Encounter   Procedures     Home Blood Pressure Monitor Order      The patient was assessed and it was determined the patient is in need of the following listed DME Supplies/Equipment. Please complete supporting documentation below to demonstrate medical necessity.      DME All Other Item(s) Documentation    List reason for need and supporting documentation for medical necessity below for each DME item.     1. Uncontrolled hypertension          Answers for HPI/ROS submitted by the patient on 4/30/2021   Annual Exam:  Frequency of exercise:: 4-5 days/week  Getting at least 3 servings of Calcium per day:: Yes  Diet:: Low salt, Low fat/cholesterol  Taking medications regularly:: Yes  Medication side effects:: None  Bi-annual eye exam:: Yes  Dental care twice a year:: NO  Sleep apnea or symptoms of sleep apnea:: None  abdominal pain: No  Blood in stool: No  Blood in urine: No  chest pain: No  chills: No  congestion: No  constipation: No  cough: No  diarrhea: No  dizziness: No  ear pain: No  eye pain: No  nervous/anxious: Yes  fever: No  frequency: No  genital sores: No  headaches: No  hearing loss: Yes  heartburn: No  arthralgias: No  joint swelling: No  peripheral edema: No  mood changes: No  myalgias: No  nausea: No  dysuria: No  palpitations: No  Skin sensation changes: No  sore throat: No  urgency: No  rash: No  shortness of breath: No  visual disturbance: No  weakness: No  impotence: No  penile discharge: No  Additional concerns today:: Yes  Duration of exercise:: 45-60 minutes

## 2021-05-04 NOTE — PROGRESS NOTES
SUBJECTIVE:   CC: Neville Magallanes is an 45 year old male who presents for preventative health visit.       Patient has been advised of split billing requirements and indicates understanding: Yes   Patient would still like to discuss the following concern(s):  1. testosterone refill  2. Elevated BP at dentist and now here. Denies CP, SOB, headaches, dizziness.  He reports he had been chewing nicotine gum and he stopped that. He reports he does exercise.       Healthy Habits:     Getting at least 3 servings of Calcium per day:  Yes    Bi-annual eye exam:  Yes    Dental care twice a year:  NO    Sleep apnea or symptoms of sleep apnea:  None    Diet:  Low salt and Low fat/cholesterol    Frequency of exercise:  4-5 days/week    Duration of exercise:  45-60 minutes    Taking medications regularly:  Yes    Medication side effects:  None    PHQ-2 Total Score: 2    Additional concerns today:  Yes          Today's PHQ-2 Score:   PHQ-2 ( 1999 [employer]) 4/30/2021   Q1: Little interest or pleasure in doing things 1   Q2: Feeling down, depressed or hopeless 1   PHQ-2 Score 2   Q1: Little interest or pleasure in doing things Several days   Q2: Feeling down, depressed or hopeless Several days   PHQ-2 Score 2       Abuse: Current or Past(Physical, Sexual or Emotional)- No  Do you feel safe in your environment? Yes    Have you ever done Advance Care Planning? (For example, a Health Directive, POLST, or a discussion with a medical provider or your loved ones about your wishes): No, advance care planning information given to patient to review.  Patient plans to discuss their wishes with loved ones or provider.      Social History     Tobacco Use     Smoking status: Former Smoker     Packs/day: 0.00     Years: 0.00     Pack years: 0.00     Smokeless tobacco: Never Used   Substance Use Topics     Alcohol use: Yes     Alcohol/week: 0.0 standard drinks     Comment: social         Alcohol Use 4/30/2021   Prescreen: >3 drinks/day or >7  drinks/week? No   Prescreen: >3 drinks/day or >7 drinks/week? -       Last PSA: No results found for: PSA    Reviewed orders with patient. Reviewed health maintenance and updated orders accordingly - Yes  Lab work is in process  Labs reviewed in EPIC  BP Readings from Last 3 Encounters:   05/04/21 (!) 147/99   02/04/19 143/90   02/15/18 132/88    Wt Readings from Last 3 Encounters:   05/04/21 92 kg (202 lb 12.8 oz)   02/04/19 95.7 kg (211 lb)   02/15/18 92.1 kg (203 lb)                  Patient Active Problem List   Diagnosis     History of anabolic steroid use     Hypotestosteronism     Hyperlipidemia LDL goal <130     Uncontrolled hypertension     Former smoker     Past Surgical History:   Procedure Laterality Date     ARTHROSCOPIC RECONSTRUCTION ANTERIOR CRUCIATE LIGAMENT Left 5/5/2016    Procedure: ARTHROSCOPIC RECONSTRUCTION ANTERIOR CRUCIATE LIGAMENT;  Surgeon: Casey Egan MD;  Location:  OR       Social History     Tobacco Use     Smoking status: Former Smoker     Packs/day: 0.00     Years: 0.00     Pack years: 0.00     Smokeless tobacco: Never Used   Substance Use Topics     Alcohol use: Yes     Alcohol/week: 0.0 standard drinks     Comment: social     Family History   Problem Relation Age of Onset     Hypertension Maternal Grandfather          Current Outpatient Medications   Medication Sig Dispense Refill     lisinopril (ZESTRIL) 10 MG tablet Take 1 tablet (10 mg) by mouth daily 90 tablet 3     testosterone cypionate (DEPOTESTOSTERONE) 100 MG/ML injection INJECT 0.75 MLS (75 MG) INTO THE MUSCLE ONCE A WEEK 10 mL 2     No Known Allergies  Recent Labs   Lab Test 02/15/18  0947 12/07/16  0938   * 186*   HDL 30* 37*   TRIG 168* 155*   ALT  --  50   CR  --  1.13   GFRESTIMATED  --  71   GFRESTBLACK  --  86   POTASSIUM  --  4.3        Reviewed and updated as needed this visit by clinical staff  Tobacco  Allergies  Meds   Med Hx  Surg Hx  Fam Hx  Soc Hx        Reviewed and updated as needed  "this visit by Provider                Past Medical History:   Diagnosis Date     Uncontrolled hypertension 5/4/2021      Past Surgical History:   Procedure Laterality Date     ARTHROSCOPIC RECONSTRUCTION ANTERIOR CRUCIATE LIGAMENT Left 5/5/2016    Procedure: ARTHROSCOPIC RECONSTRUCTION ANTERIOR CRUCIATE LIGAMENT;  Surgeon: Casey Egan MD;  Location:  OR       Review of Systems  CONSTITUTIONAL: NEGATIVE for fever, chills, change in weight  INTEGUMENTARY/SKIN: NEGATIVE for worrisome rashes, moles or lesions  EYES: NEGATIVE for vision changes or irritation  ENT: NEGATIVE for ear, mouth and throat problems  RESP: NEGATIVE for significant cough or SOB  CV: NEGATIVE for chest pain, palpitations or peripheral edema  GI: NEGATIVE for nausea, abdominal pain, heartburn, or change in bowel habits   male: negative for dysuria, hematuria, decreased urinary stream, erectile dysfunction, urethral discharge  MUSCULOSKELETAL: NEGATIVE for significant arthralgias or myalgia  NEURO: NEGATIVE for weakness, dizziness or paresthesias  ENDOCRINE: NEGATIVE for temperature intolerance, skin/hair changes  HEME/ALLERGY/IMMUNE: NEGATIVE for bleeding problems  PSYCHIATRIC: NEGATIVE for changes in mood or affect    OBJECTIVE:   BP (!) 147/99   Pulse 83   Temp 97.1  F (36.2  C) (Tympanic)   Resp 16   Ht 1.71 m (5' 7.32\")   Wt 92 kg (202 lb 12.8 oz)   SpO2 97%   BMI 31.46 kg/m      Physical Exam  GENERAL: healthy, alert and no distress  EYES: Eyes grossly normal to inspection, PERRL and conjunctivae and sclerae normal  HENT: ear canals and TM's normal, nose and mouth without ulcers or lesions  NECK: no adenopathy, no asymmetry, masses, or scars and thyroid normal to palpation  RESP: lungs clear to auscultation - no rales, rhonchi or wheezes  CV: regular rate and rhythm, normal S1 S2, no S3 or S4, no murmur, click or rub, no peripheral edema and peripheral pulses strong  ABDOMEN: soft, nontender, no hepatosplenomegaly, no masses " "and bowel sounds normal   (male): deferred, no concerns  MS: no gross musculoskeletal defects noted, no edema, no CVA tenderness  SKIN: no suspicious lesions or rashes  NEURO: Normal strength and tone, cranial nerves intact, mentation intact and speech normal  PSYCH: mentation appears normal, affect normal/bright  LYMPH: no cervical, supraclavicular, axillary adenopathy    Diagnostic Test Results:  Labs reviewed in Epic  See orders    ASSESSMENT/PLAN:       ICD-10-CM    1. Encounter for general adult medical examination with abnormal findings  Z00.01    2. Advance care planning  Z71.89    3. Uncontrolled hypertension  I10 Home Blood Pressure Monitor Order     lisinopril (ZESTRIL) 10 MG tablet     Basic metabolic panel  (Ca, Cl, CO2, Creat, Gluc, K, Na, BUN)   4. Lipid screening  Z13.220 Lipid panel reflex to direct LDL Non-fasting   5. Encounter for hepatitis C screening test for low risk patient  Z11.59 Hepatitis C Screen Reflex to HCV RNA Quant and Genotype   6. Screening for human immunodeficiency virus without presence of risk factors  Z11.4 HIV Antigen Antibody Combo   7. Screening for diabetes mellitus  Z13.1 CANCELED: GLUCOSE   8. Hypotestosteronism  E34.9 testosterone cypionate (DEPOTESTOSTERONE) 100 MG/ML injection     Testosterone, total   9. Screening for prostate cancer  Z12.5 PSA, screen   10. Former smoker  Z87.891        Patient has been advised of split billing requirements and indicates understanding: Yes  COUNSELING:   Reviewed preventive health counseling, as reflected in patient instructions    Estimated body mass index is 31.46 kg/m  as calculated from the following:    Height as of this encounter: 1.71 m (5' 7.32\").    Weight as of this encounter: 92 kg (202 lb 12.8 oz).     Weight management plan: Discussed healthy diet and exercise guidelines    He reports that he has quit smoking. He smoked 0.00 packs per day for 0.00 years. He has never used smokeless tobacco.      Counseling " Resources:  ATP IV Guidelines  Pooled Cohorts Equation Calculator  FRAX Risk Assessment  ICSI Preventive Guidelines  Dietary Guidelines for Americans, 2010  USDA's MyPlate  ASA Prophylaxis  Lung CA Screening    KENNY Eduardo  [facility]

## 2021-05-05 LAB
HCV AB SERPL QL IA: NONREACTIVE
HIV 1+2 AB+HIV1 P24 AG SERPL QL IA: NONREACTIVE

## 2021-05-05 NOTE — RESULT ENCOUNTER NOTE
Osbaldo Lozada,    Thank you for your recent office visit.    Here are your recent results.  Normal non-fasting blood labs.     Feel free to contact me via [employer] or call the clinic at [phone].    Sincerely,    PABLO Holliday, FNP-BC

## 2021-05-06 LAB — TESTOST SERPL-MCNC: 1573 NG/DL (ref 240–950)

## 2021-05-06 NOTE — RESULT ENCOUNTER NOTE
Osbaldo Lozada,    Thank you for your recent office visit.    Here are your recent results.  Your testosterone level is higher than normal.    Feel free to contact me via [employer] or call the clinic at [phone].    Sincerely,    PABLO Holliday, FNP-BC

## 2021-08-31 ENCOUNTER — MYC MEDICAL ADVICE (OUTPATIENT)
Dept: FAMILY MEDICINE | Facility: CLINIC | Age: 46
End: 2021-08-31

## 2021-08-31 ENCOUNTER — MYC MEDICAL ADVICE (OUTPATIENT)
Dept: ORTHOPEDICS | Facility: CLINIC | Age: 46
End: 2021-08-31

## 2021-10-04 ENCOUNTER — HEALTH MAINTENANCE LETTER (OUTPATIENT)
Age: 46
End: 2021-10-04

## 2022-02-02 ENCOUNTER — MYC MEDICAL ADVICE (OUTPATIENT)
Dept: ORTHOPEDICS | Facility: CLINIC | Age: 47
End: 2022-02-02
Payer: COMMERCIAL

## 2022-02-04 NOTE — PROGRESS NOTES
"CHIEF COMPLAINT:   Chief Complaint   Patient presents with     RECHECK     Patient was seen previously for a right knee ACL seperation and would like to proceed with ACL repair. Patient states he has no pain, just instabililty. Last time the knee shifted was 2 months ago while dancing, pain continued for a couple of days after. Patient has no pain today.   .        HISTORY OF PRESENT ILLNESS    Neville Magallanes is a 46 year old male seen for followup evaluation of ongoing right knee pain with a possible injury. 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 We discussed surgery and planned surgical reconstruction at that time, but never went through with it. He returns today wanting to proceed with surgery now. Currently no pain bu the knee feels unstable. Had an episode about 2 months ago while dancing, the knee \"shifted\", with pain for a few days afterwards. Instability episodes are rare as not doing much from a martial arts side of things. When has pain, in the \"center\" of the knee behind the kneecap. He's currently not working so is a good time for him now to have surgery and recover.    Back in November 2018 he was doing some martial arts training with another person, whom had their legs wrapped around him. He states he felt a \"pop\" in his knee, not much pain, slight discomfort. He didn't notice any swelling. Then a couple of months later [place] [employer] [place] and felt his right knee \"give out\" causing him to go down. Onset of pain, swelling.      History of prior left knee anterior cruciate ligament reconstruction with hamstrings autograft 2016 and has done well.    Pain severity: 0/10  Frequency of symptoms: occasionally  Patient has tried:     NSAIDS: No      Physical Therapy: No      Activity modification: Yes      Bracing: Yes      Injections: No     Ice: No      Assistive device:  No    Orthopaedic PMH: left knee anterior cruciate ligament " "reconstruction with hamstrings autograft 5/2016.    Other PMH:  has a past medical history of Uncontrolled hypertension (5/4/2021).    He has no past medical history of Arthritis, Cancer (H), Cerebral infarction (H), Congestive heart failure (H), COPD (chronic obstructive pulmonary disease) (H), Depressive disorder, Diabetes (H), Heart disease, History of blood transfusion, Thyroid disease, or Uncomplicated asthma.  Patient Active Problem List   Diagnosis     History of anabolic steroid use     Hypotestosteronism     Hyperlipidemia LDL goal <130     Uncontrolled hypertension     Former smoker       Surgical Hx:  has a past surgical history that includes Arthroscopic reconstruction anterior cruciate ligament (Left, 5/5/2016).    Medications:   Current Outpatient Medications:      lisinopril (ZESTRIL) 10 MG tablet, Take 1 tablet (10 mg) by mouth daily, Disp: 90 tablet, Rfl: 3     testosterone cypionate (DEPOTESTOSTERONE) 100 MG/ML injection, INJECT 0.75 MLS (75 MG) INTO THE MUSCLE ONCE A WEEK, Disp: 10 mL, Rfl: 2    Allergies: No Known Allergies    Social Hx: currently unemployed, looking for work.  reports that he has quit smoking. He smoked 0.00 packs per day for 0.00 years. He has never used smokeless tobacco. He reports current alcohol use. He reports that he does not use drugs.    Family Hx: family history includes Hypertension in his maternal grandfather.    REVIEW OF SYSTEMS: 10 point ROS neg other than the symptoms noted above in the HPI and PMH. Notables include  CONSTITUTIONAL:NEGATIVE for fever, chills, change in weight  INTEGUMENTARY/SKIN: NEGATIVE for worrisome rashes, moles or lesions  MUSCULOSKELETAL:See HPI above  NEURO: NEGATIVE for weakness, dizziness or paresthesias    PHYSICAL EXAM:  /84   Pulse 67   Resp 18   Ht 1.708 m (5' 7.25\")   Wt 91.6 kg (202 lb)   BMI 31.40 kg/m     GENERAL APPEARANCE: healthy, alert, no distress  SKIN: no suspicious lesions or rashes  NEURO: Normal strength and " tone, mentation intact and speech normal  PSYCH:  mentation appears normal and affect normal, not anxious  RESPIRATORY: No increased work of breathing.  HANDS: no clubbing, nail pitting  LYMPH: no palpable popliteal lymphadenopathy.    BILATERAL LOWER EXTREMITIES:  Gait: normal  Alignment: varus  No gross deformities or masses.  No Quad atrophy, strength normal.  Intact sensation deep peroneal nerve, superficial peroneal nerve, med/lat tibial nerve, sural nerve, saphenous nerve  Intact EHL, EDL, TA, FHL, GS, quadriceps hamstrings and hip flexors  Toes warm and well perfused, brisk capillary refill. Palpable 2+ dp pulses.  Bilateral calf soft and nttp or squeeze.  DTRs: achilles 2+, patella 2+.  Edema: none    LEFT KNEE EXAM:    Skin: intact, no ecchymosis or erythema; surgical scars noted.  Squat: 100 %, not limited by pain.     ROM: 0 extension to 130+ flexion  Effusion: none  Tender: NTTP med/lat joint line, anterior or posterior knee  McMurrays: negative    Varus/valgus laxity with endpiont  Lachmans: 2A, firm endpoint  Posterior Drawer stable  Patellofemoral joint:                Apprehension: negative              Crepitations: minimal    RIGHT KNEE EXAM:    Skin: intact, no ecchymosis or erythema  Squat: 100 %, not limited by pain.    ROM: several degrees hyperextension to 130+ flexion  Effusion: trace  Tender: none.  McMurrays: negative    Varus/valgus laxity with endpoint  Lachmans: grade 3B  Posterior Drawer stable  Positive pivot shift.  Patellofemoral joint:                Apprehension: negative              Crepitations: minimal    X-RAY:  3 views right knee 2/7/2022 reviewed, No obvious fracture or dislocation. No obvious bony abnormality or lesion. .      MRI right knee 2/11/2019:  1. ACL tear with associated tibial plateau contusions.  2. Patellofemoral chondromalacia.  3. Mild effusion.  4. Baker's cyst with adjacent reactive edema versus leakage.       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    Discussed with patient his injury of anterior cruciate ligament disruption from several years ago, with intermittent instability.     The anterior cruciate ligament provides stability of the knee by limiting anterior tibial translation. This is a common knee injury. Various treatment options exist, and an informed autonomous decision is made with respect to how to proceed with treatment. Consideration is made based on age, activity level, occupation and physical demands, as well as symptoms.    * treatment options include nonoperative with Physical Therapy working on range of motion and strengthening, activity modification, and the use of an anterior cruciate ligament stabilizing brace. Surgical options include anterior cruciate ligament reconstruction with either autograft or allograft. Options with autograft include hamstring and bone-patella tendon-bone. Benefits and disadvantages or each were discussed. Long term studies and meta-analyses do not show a clear benefit with either technique, as both seem to provide good results. Additionally, allograft options were discussed, advantages and disadvantages, including risk of disease transmission.  * Perioperative and post-operative recovery and rehabilitation was discussed.  * risks of any surgery, include: bleeding, infection, pain, scar, damage to adjacent structures such as nerves, vessels and cartilage, temporary versus permanent nerve damage, implant failure, graft failure, recurrent instability, knee stiffness and post-traumatic arthritis, need for further surgery, blood clots, pulmonary embolus, risks of anesthesia and death.  * Understanding the options of treatment, as well as the risks and benefits of each, the patient would like to proceed with right knee anterior cruciate ligament reconstruction.  * will plan for: right knee anterior cruciate ligament  reconstruction with hamstring autograft, possible meniscus debridement versus repair, possible chondral debridement, outpatient surgery  * patient advised of daily aspirin for a minimum of 2 weeks postoperative, use of crutches and brace until good return of quadriceps strengthening usually about 4 weeks.  * patient to continue with strengthening, range of motion exercises prior to surgery.  * will see patient within a week postoperative for wound check, start Physical Therapy per protocol, sooner as needed.  * all the patients questions addressed and answered to satisfaction today. Patient advised to call if questions arise in the meantime.    * H+P per primary care provider  * covid testing per site policy    * All questions were addressed and answered prior to discharge from clinic today. The patient acknowledges an understanding of and agreement with the plan set forth during today's visit. Patient was advised to call our office or MyChart us if any further questions arise upon leaving our office today.        Casey Egan M.D., M.S.  Dept. of Orthopaedic Surgery  [facility]

## 2022-02-07 ENCOUNTER — TELEPHONE (OUTPATIENT)
Dept: ORTHOPEDICS | Facility: CLINIC | Age: 47
End: 2022-02-07

## 2022-02-07 ENCOUNTER — ANCILLARY PROCEDURE (OUTPATIENT)
Dept: GENERAL RADIOLOGY | Facility: CLINIC | Age: 47
End: 2022-02-07
Attending: ORTHOPAEDIC SURGERY
Payer: COMMERCIAL

## 2022-02-07 ENCOUNTER — OFFICE VISIT (OUTPATIENT)
Dept: ORTHOPEDICS | Facility: CLINIC | Age: 47
End: 2022-02-07
Payer: COMMERCIAL

## 2022-02-07 VITALS
HEART RATE: 67 BPM | SYSTOLIC BLOOD PRESSURE: 135 MMHG | BODY MASS INDEX: 31.71 KG/M2 | DIASTOLIC BLOOD PRESSURE: 84 MMHG | HEIGHT: 67 IN | WEIGHT: 202 LBS | RESPIRATION RATE: 18 BRPM

## 2022-02-07 DIAGNOSIS — S83.511D RUPTURE OF ANTERIOR CRUCIATE LIGAMENT OF RIGHT KNEE, SUBSEQUENT ENCOUNTER: Primary | ICD-10-CM

## 2022-02-07 DIAGNOSIS — S83.511D RUPTURE OF ANTERIOR CRUCIATE LIGAMENT OF RIGHT KNEE, SUBSEQUENT ENCOUNTER: ICD-10-CM

## 2022-02-07 PROCEDURE — 73562 X-RAY EXAM OF KNEE 3: CPT | Mod: RT | Performed by: RADIOLOGY

## 2022-02-07 PROCEDURE — 99204 OFFICE O/P NEW MOD 45 MIN: CPT | Performed by: ORTHOPAEDIC SURGERY

## 2022-02-07 ASSESSMENT — PAIN SCALES - GENERAL: PAINLEVEL: NO PAIN (0)

## 2022-02-07 ASSESSMENT — MIFFLIN-ST. JEOR: SCORE: 1748.86

## 2022-02-07 NOTE — TELEPHONE ENCOUNTER
Type of surgery: reconstruction,right knee,anterior cruciate ligament,arthroscopic with hamstring autograft,possible meniscus repair versus debridement,possible chondral debridement (right)    CPT 06654, poss 33932, [id number]  Rupture of anterior cruciate ligament of right knee, subsequent encounter S83.511D    Location of surgery: [facility]  Date and time of surgery: 4-7-22  tbd  Surgeon: Dr Egan  Pre-Op Appt Date: 3-24-22  Post-Op Appt Date: 4-21-22   Packet sent out: Yes  Pre-cert/Authorization completed:    I have started prior auth on [employer] Tracking ID [id number] ref # [id number]  Date: 02/07/2022    Thank you,   Alla Goddard   Prior Authorization Department  [phone]

## 2022-02-07 NOTE — LETTER
"    2/7/2022         RE: Neville Magallanes  [address] [place] [address]        Dear Colleague,    Thank you for referring your patient, Neville Magallanes, to the [facility] [place]. Please see a copy of my visit note below.    CHIEF COMPLAINT:   Chief Complaint   Patient presents with     RECHECK     Patient was seen previously for a right knee ACL seperation and would like to proceed with ACL repair. Patient states he has no pain, just instabililty. Last time the knee shifted was 2 months ago while dancing, pain continued for a couple of days after. Patient has no pain today.   .        HISTORY OF PRESENT ILLNESS    Neville Magallanes is a 46 year old male seen for followup evaluation of ongoing right knee pain with a possible injury. 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 We discussed surgery and planned surgical reconstruction at that time, but never went through with it. He returns today wanting to proceed with surgery now. Currently no pain bu the knee feels unstable. Had an episode about 2 months ago while dancing, the knee \"shifted\", with pain for a few days afterwards. Instability episodes are rare as not doing much from a martial arts side of things. When has pain, in the \"center\" of the knee behind the kneecap. He's currently not working so is a good time for him now to have surgery and recover.    Back in November 2018 he was doing some martial arts training with another person, whom had their legs wrapped around him. He states he felt a \"pop\" in his knee, not much pain, slight discomfort. He didn't notice any swelling. Then a couple of months later he was at a [employer] park and felt his right knee \"give out\" causing him to go down. Onset of pain, swelling.      History of prior left knee anterior cruciate ligament reconstruction with hamstrings autograft 2016 and has done well.    Pain severity: 0/10  Frequency of " symptoms: occasionally  Patient has tried:     NSAIDS: No      Physical Therapy: No      Activity modification: Yes      Bracing: Yes      Injections: No     Ice: No      Assistive device:  No    Orthopaedic PMH: left knee anterior cruciate ligament reconstruction with hamstrings autograft 5/2016.    Other PMH:  has a past medical history of Uncontrolled hypertension (5/4/2021).    He has no past medical history of Arthritis, Cancer (H), Cerebral infarction (H), Congestive heart failure (H), COPD (chronic obstructive pulmonary disease) (H), Depressive disorder, Diabetes (H), Heart disease, History of blood transfusion, Thyroid disease, or Uncomplicated asthma.  Patient Active Problem List   Diagnosis     History of anabolic steroid use     Hypotestosteronism     Hyperlipidemia LDL goal <130     Uncontrolled hypertension     Former smoker       Surgical Hx:  has a past surgical history that includes Arthroscopic reconstruction anterior cruciate ligament (Left, 5/5/2016).    Medications:   Current Outpatient Medications:      lisinopril (ZESTRIL) 10 MG tablet, Take 1 tablet (10 mg) by mouth daily, Disp: 90 tablet, Rfl: 3     testosterone cypionate (DEPOTESTOSTERONE) 100 MG/ML injection, INJECT 0.75 MLS (75 MG) INTO THE MUSCLE ONCE A WEEK, Disp: 10 mL, Rfl: 2    Allergies: No Known Allergies    Social Hx: currently unemployed, looking for work.  reports that he has quit smoking. He smoked 0.00 packs per day for 0.00 years. He has never used smokeless tobacco. He reports current alcohol use. He reports that he does not use drugs.    Family Hx: family history includes Hypertension in his maternal grandfather.    REVIEW OF SYSTEMS: 10 point ROS neg other than the symptoms noted above in the HPI and PMH. Notables include  CONSTITUTIONAL:NEGATIVE for fever, chills, change in weight  INTEGUMENTARY/SKIN: NEGATIVE for worrisome rashes, moles or lesions  MUSCULOSKELETAL:See HPI above  NEURO: NEGATIVE for weakness, dizziness or  "paresthesias    PHYSICAL EXAM:  /84   Pulse 67   Resp 18   Ht 1.708 m (5' 7.25\")   Wt 91.6 kg (202 lb)   BMI 31.40 kg/m     GENERAL APPEARANCE: healthy, alert, no distress  SKIN: no suspicious lesions or rashes  NEURO: Normal strength and tone, mentation intact and speech normal  PSYCH:  mentation appears normal and affect normal, not anxious  RESPIRATORY: No increased work of breathing.  HANDS: no clubbing, nail pitting  LYMPH: no palpable popliteal lymphadenopathy.    BILATERAL LOWER EXTREMITIES:  Gait: normal  Alignment: varus  No gross deformities or masses.  No Quad atrophy, strength normal.  Intact sensation deep peroneal nerve, superficial peroneal nerve, med/lat tibial nerve, sural nerve, saphenous nerve  Intact EHL, EDL, TA, FHL, GS, quadriceps hamstrings and hip flexors  Toes warm and well perfused, brisk capillary refill. Palpable 2+ dp pulses.  Bilateral calf soft and nttp or squeeze.  DTRs: achilles 2+, patella 2+.  Edema: none    LEFT KNEE EXAM:    Skin: intact, no ecchymosis or erythema; surgical scars noted.  Squat: 100 %, not limited by pain.     ROM: 0 extension to 130+ flexion  Effusion: none  Tender: NTTP med/lat joint line, anterior or posterior knee  McMurrays: negative    Varus/valgus laxity with endpiont  Lachmans: 2A, firm endpoint  Posterior Drawer stable  Patellofemoral joint:                Apprehension: negative              Crepitations: minimal    RIGHT KNEE EXAM:    Skin: intact, no ecchymosis or erythema  Squat: 100 %, not limited by pain.    ROM: several degrees hyperextension to 130+ flexion  Effusion: trace  Tender: none.  McMurrays: negative    Varus/valgus laxity with endpoint  Lachmans: grade 3B  Posterior Drawer stable  Positive pivot shift.  Patellofemoral joint:                Apprehension: negative              Crepitations: minimal    X-RAY:  3 views right knee 2/7/2022 reviewed, No obvious fracture or dislocation. No obvious bony abnormality or lesion. " .      MRI right knee 2/11/2019:  1. ACL tear with associated tibial plateau contusions.  2. Patellofemoral chondromalacia.  3. Mild effusion.  4. Baker's cyst with adjacent reactive edema versus leakage.       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    Discussed with patient his injury of anterior cruciate ligament disruption from several years ago, with intermittent instability.     The anterior cruciate ligament provides stability of the knee by limiting anterior tibial translation. This is a common knee injury. Various treatment options exist, and an informed autonomous decision is made with respect to how to proceed with treatment. Consideration is made based on age, activity level, occupation and physical demands, as well as symptoms.    * treatment options include nonoperative with Physical Therapy working on range of motion and strengthening, activity modification, and the use of an anterior cruciate ligament stabilizing brace. Surgical options include anterior cruciate ligament reconstruction with either autograft or allograft. Options with autograft include hamstring and bone-patella tendon-bone. Benefits and disadvantages or each were discussed. Long term studies and meta-analyses do not show a clear benefit with either technique, as both seem to provide good results. Additionally, allograft options were discussed, advantages and disadvantages, including risk of disease transmission.  * Perioperative and post-operative recovery and rehabilitation was discussed.  * risks of any surgery, include: bleeding, infection, pain, scar, damage to adjacent structures such as nerves, vessels and cartilage, temporary versus permanent nerve damage, implant failure, graft failure, recurrent instability, knee stiffness and post-traumatic arthritis, need for further surgery, blood clots, pulmonary embolus, risks of  anesthesia and death.  * Understanding the options of treatment, as well as the risks and benefits of each, the patient would like to proceed with right knee anterior cruciate ligament reconstruction.  * will plan for: right knee anterior cruciate ligament reconstruction with hamstring autograft, possible meniscus debridement versus repair, possible chondral debridement, outpatient surgery  * patient advised of daily aspirin for a minimum of 2 weeks postoperative, use of crutches and brace until good return of quadriceps strengthening usually about 4 weeks.  * patient to continue with strengthening, range of motion exercises prior to surgery.  * will see patient within a week postoperative for wound check, start Physical Therapy per protocol, sooner as needed.  * all the patients questions addressed and answered to satisfaction today. Patient advised to call if questions arise in the meantime.    * H+P per primary care provider  * covid testing per site policy    * All questions were addressed and answered prior to discharge from clinic today. The patient acknowledges an understanding of and agreement with the plan set forth during today's visit. Patient was advised to call our office or MyChart us if any further questions arise upon leaving our office today.        Casey Egan M.D., M.S.  Dept. of Orthopaedic Surgery  [facility]          Again, thank you for allowing me to participate in the care of your patient.        Sincerely,        Casey Egan MD

## 2022-02-18 NOTE — TELEPHONE ENCOUNTER
Fax from [facility], surgery has been approved for date range of 04/07/22-6/07/22  Auth # [id number]

## 2022-02-25 DIAGNOSIS — Z11.59 ENCOUNTER FOR SCREENING FOR OTHER VIRAL DISEASES: Primary | ICD-10-CM

## 2022-03-30 ENCOUNTER — OFFICE VISIT (OUTPATIENT)
Dept: FAMILY MEDICINE | Facility: CLINIC | Age: 47
End: 2022-03-30
Payer: COMMERCIAL

## 2022-03-30 VITALS
HEIGHT: 67 IN | SYSTOLIC BLOOD PRESSURE: 128 MMHG | RESPIRATION RATE: 20 BRPM | BODY MASS INDEX: 33.14 KG/M2 | TEMPERATURE: 98 F | OXYGEN SATURATION: 96 % | DIASTOLIC BLOOD PRESSURE: 89 MMHG | WEIGHT: 211.13 LBS | HEART RATE: 89 BPM

## 2022-03-30 DIAGNOSIS — Z01.818 PREOPERATIVE EXAMINATION: Primary | ICD-10-CM

## 2022-03-30 DIAGNOSIS — E34.9 HYPOTESTOSTERONISM: ICD-10-CM

## 2022-03-30 DIAGNOSIS — I10 ESSENTIAL HYPERTENSION: ICD-10-CM

## 2022-03-30 DIAGNOSIS — S83.511D RUPTURE OF ANTERIOR CRUCIATE LIGAMENT OF RIGHT KNEE, SUBSEQUENT ENCOUNTER: ICD-10-CM

## 2022-03-30 LAB
ANION GAP SERPL CALCULATED.3IONS-SCNC: 7 MMOL/L (ref 3–14)
BUN SERPL-MCNC: 19 MG/DL (ref 7–30)
CALCIUM SERPL-MCNC: 9.5 MG/DL (ref 8.5–10.1)
CHLORIDE BLD-SCNC: 103 MMOL/L (ref 94–109)
CO2 SERPL-SCNC: 29 MMOL/L (ref 20–32)
CREAT SERPL-MCNC: 1.37 MG/DL (ref 0.66–1.25)
ERYTHROCYTE [DISTWIDTH] IN BLOOD BY AUTOMATED COUNT: 13.9 % (ref 10–15)
GFR SERPL CREATININE-BSD FRML MDRD: 64 ML/MIN/1.73M2
GLUCOSE BLD-MCNC: 104 MG/DL (ref 70–99)
HCT VFR BLD AUTO: 53.4 % (ref 40–53)
HGB BLD-MCNC: 19.3 G/DL (ref 13.3–17.7)
MCH RBC QN AUTO: 32.8 PG (ref 26.5–33)
MCHC RBC AUTO-ENTMCNC: 36.1 G/DL (ref 31.5–36.5)
MCV RBC AUTO: 91 FL (ref 78–100)
PLATELET # BLD AUTO: 215 10E3/UL (ref 150–450)
POTASSIUM BLD-SCNC: 4.4 MMOL/L (ref 3.4–5.3)
RBC # BLD AUTO: 5.88 10E6/UL (ref 4.4–5.9)
SODIUM SERPL-SCNC: 139 MMOL/L (ref 133–144)
WBC # BLD AUTO: 8.3 10E3/UL (ref 4–11)

## 2022-03-30 PROCEDURE — 99214 OFFICE O/P EST MOD 30 MIN: CPT | Performed by: STUDENT IN AN ORGANIZED HEALTH CARE EDUCATION/TRAINING PROGRAM

## 2022-03-30 PROCEDURE — 85027 COMPLETE CBC AUTOMATED: CPT | Performed by: STUDENT IN AN ORGANIZED HEALTH CARE EDUCATION/TRAINING PROGRAM

## 2022-03-30 PROCEDURE — 80048 BASIC METABOLIC PNL TOTAL CA: CPT | Performed by: STUDENT IN AN ORGANIZED HEALTH CARE EDUCATION/TRAINING PROGRAM

## 2022-03-30 PROCEDURE — 36415 COLL VENOUS BLD VENIPUNCTURE: CPT | Performed by: STUDENT IN AN ORGANIZED HEALTH CARE EDUCATION/TRAINING PROGRAM

## 2022-03-30 ASSESSMENT — PAIN SCALES - GENERAL: PAINLEVEL: NO PAIN (0)

## 2022-03-30 NOTE — PROGRESS NOTES
[facility] [place]  [address]  [place] [address]  Phone: [phone]  Primary Provider: Haile Tam  Pre-op Performing Provider: BEAR ANDREA    {Provider  Link to PREOP SmartSet  Use this to apply standard patient instructions to AVS; includes medication directions, common orders, guidelines for anemia, warfarin, additional testing   :[id number]}  PREOPERATIVE EVALUATION:  Today's date: 3/30/2022    Neville Magallanes is a 46 year old male who presents for a preoperative evaluation.    Surgical Information:  Surgery/Procedure: Reconstruction, R Knee, Anterior Cruciate Ligament, Arthroscopic with hamstring autograft, possible meniscus repair versus debridement, possible chondral debridement    Surgery Location: [facility]   Surgeon: Jignesh  Surgery Date: 4/7/22  Time of Surgery: 7am   Where patient plans to recover: At home with family  Fax number for surgical facility: Note does not need to be faxed, will be available electronically in Epic.    Type of Anesthesia Anticipated: General    Assessment & Plan     The proposed surgical procedure is considered INTERMEDIATE risk.  1. Preoperative examination    - Basic metabolic panel  (Ca, Cl, CO2, Creat, Gluc, K, Na, BUN); Future  - CBC with platelets; Future  - Basic metabolic panel  (Ca, Cl, CO2, Creat, Gluc, K, Na, BUN)  - CBC with platelets    2. Rupture of anterior cruciate ligament of right knee, subsequent encounter    - Basic metabolic panel  (Ca, Cl, CO2, Creat, Gluc, K, Na, BUN); Future  - CBC with platelets; Future  - Basic metabolic panel  (Ca, Cl, CO2, Creat, Gluc, K, Na, BUN)  - CBC with platelets        4. Essential hypertension  Controlled  Discontinue Lisinopril due to elevated cr and start amlodipine.  Recheck cr after surgery  5. Hypotestosteronism  Hold testosterone  Make follow up appointment after the surgery to further evaluate regimen         Risks and Recommendations:  The patient has  the following additional risks and recommendations for perioperative complications:  Cr is slightly elevated. Recommend avoiding nephrotoxins e.g NSAIDs.    hematocrit and Hemoglobin are elevated: recommend to hold Testoerone starting from today      RECOMMENDATION:  APPROVAL GIVEN to proceed with proposed procedure, without further diagnostic evaluation.    [id number]}    Subjective     HPI related to upcoming procedure: ruptured of the right acl causing pain.    Preop Questions 3/30/2022   1. Have you ever had a heart attack or stroke? No   2. Have you ever had surgery on your heart or blood vessels, such as a stent placement, a coronary artery bypass, or surgery on an artery in your head, neck, heart, or legs? No   3. Do you have chest pain with activity? No   4. Do you have a history of  heart failure? No   5. Do you currently have a cold, bronchitis or symptoms of other infection? No   6. Do you have a cough, shortness of breath, or wheezing? No   7. Do you or anyone in your family have previous history of blood clots? No   8. Do you or does anyone in your family have a serious bleeding problem such as prolonged bleeding following surgeries or cuts? No   9. Have you ever had problems with anemia or been told to take iron pills? No   10. Have you had any abnormal blood loss such as black, tarry or bloody stools? No   11. Have you ever had a blood transfusion? No   12. Are you willing to have a blood transfusion if it is medically needed before, during, or after your surgery? Yes   13. Have you or any of your relatives ever had problems with anesthesia? No   14. Do you have sleep apnea, excessive snoring or daytime drowsiness? No   15. Do you have any artifical heart valves or other implanted medical devices like a pacemaker, defibrillator, or continuous glucose monitor? No   16. Do you have artificial joints? No   17. Are you allergic to latex? No       Health Care Directive:  Patient does not have a Health Care  Directive or Living Will: Discussed advance care planning with patient; however, patient declined at this time.    PreStatus of Chronic Conditions:    Hypertension is controlled on lisinopril  Hypotestorone: he is on testosterone    Review of Systems  CONSTITUTIONAL: NEGATIVE for fever, chills, change in weight  ENT/MOUTH: NEGATIVE for ear, mouth and throat problems  RESP: NEGATIVE for significant cough or SOB  CV: NEGATIVE for chest pain, palpitations or peripheral edema    Patient Active Problem List    Diagnosis Date Noted     Rupture of anterior cruciate ligament of right knee, subsequent encounter 02/07/2022     Priority: Medium     Added automatically from request for surgery [id number]       Uncontrolled hypertension 05/04/2021     Priority: Medium     Former smoker 05/04/2021     Priority: Medium     Hyperlipidemia LDL goal <130 12/30/2016     Priority: Medium     History of anabolic steroid use 11/28/2016     Priority: Medium     Hypotestosteronism 11/28/2016     Priority: Medium      Past Medical History:   Diagnosis Date     Uncontrolled hypertension 5/4/2021     Past Surgical History:   Procedure Laterality Date     ARTHROSCOPIC RECONSTRUCTION ANTERIOR CRUCIATE LIGAMENT Left 5/5/2016    Procedure: ARTHROSCOPIC RECONSTRUCTION ANTERIOR CRUCIATE LIGAMENT;  Surgeon: Casey Egan MD;  Location: [facility] OR     Current Outpatient Medications   Medication Sig Dispense Refill     lisinopril (ZESTRIL) 10 MG tablet Take 1 tablet (10 mg) by mouth daily 90 tablet 3     testosterone cypionate (DEPOTESTOSTERONE) 100 MG/ML injection INJECT 0.75 MLS (75 MG) INTO THE MUSCLE ONCE A WEEK 3 mL 0       No Known Allergies     Social History     Tobacco Use     Smoking status: Former Smoker     Packs/day: 0.00     Years: 0.00     Pack years: 0.00     Smokeless tobacco: Never Used   Substance Use Topics     Alcohol use: Yes     Alcohol/week: 0.0 standard drinks     Comment: social     Family History   Problem Relation Age of Onset  "    Hypertension Maternal Grandfather      History   Drug Use No         Objective     BP (!) 141/84   Pulse 89   Temp 98  F (36.7  C) (Tympanic)   Resp 20   Ht 1.702 m (5' 7\")   Wt 95.8 kg (211 lb 2 oz)   SpO2 96%   BMI 33.07 kg/m      Physical Exam    GENERAL APPEARANCE: healthy, alert and no distress     EYES: EOMI,  PERRL     HENT: ear canals and TM's normal and nose and mouth without ulcers or lesions, mallampati 1     NECK: no adenopathy, no asymmetry, masses, or scars and thyroid normal to palpation     RESP: lungs clear to auscultation - no rales, rhonchi or wheezes     CV: regular rates and rhythm, normal S1 S2, no S3 or S4 and no murmur, click or rub     ABDOMEN:  soft, nontender, no HSM or masses and bowel sounds normal     MS: extremities normal- no gross deformities noted, no evidence of inflammation in joints, FROM in all extremities.     SKIN: no suspicious lesions or rashes     NEURO: Normal strength and tone, sensory exam grossly normal, mentation intact and speech normal     PSYCH: mentation appears normal. and affect normal/bright     LYMPHATICS: No cervical adenopathy    Recent Labs   Lab Test 05/04/21  1352      POTASSIUM 4.1   CR 1.27*        Diagnostics:  Recent Results (from the past 24 hour(s))   Extra Red Top Tube    Collection Time: 04/01/22  2:25 PM   Result Value Ref Range    Hold Specimen JIC      Recent Results (from the past 48 hour(s))   Extra Red Top Tube    Collection Time: 04/01/22  2:25 PM   Result Value Ref Range    Hold Specimen JIC      Recent Results (from the past 168 hour(s))   Basic metabolic panel  (Ca, Cl, CO2, Creat, Gluc, K, Na, BUN)    Collection Time: 03/30/22  4:19 PM   Result Value Ref Range    Sodium 139 133 - 144 mmol/L    Potassium 4.4 3.4 - 5.3 mmol/L    Chloride 103 94 - 109 mmol/L    Carbon Dioxide (CO2) 29 20 - 32 mmol/L    Anion Gap 7 3 - 14 mmol/L    Urea Nitrogen 19 7 - 30 mg/dL    Creatinine 1.37 (H) 0.66 - 1.25 mg/dL    Calcium 9.5 8.5 - " 10.1 mg/dL    Glucose 104 (H) 70 - 99 mg/dL    GFR Estimate 64 >60 mL/min/1.73m2   CBC with platelets    Collection Time: 03/30/22  4:19 PM   Result Value Ref Range    WBC Count 8.3 4.0 - 11.0 10e3/uL    RBC Count 5.88 4.40 - 5.90 10e6/uL    Hemoglobin 19.3 (H) 13.3 - 17.7 g/dL    Hematocrit 53.4 (H) 40.0 - 53.0 %    MCV 91 78 - 100 fL    MCH 32.8 26.5 - 33.0 pg    MCHC 36.1 31.5 - 36.5 g/dL    RDW 13.9 10.0 - 15.0 %    Platelet Count 215 150 - 450 10e3/uL   Extra Red Top Tube    Collection Time: 04/01/22  2:25 PM   Result Value Ref Range    Hold Specimen JIC      Recent Results (from the past 240 hour(s))   Basic metabolic panel  (Ca, Cl, CO2, Creat, Gluc, K, Na, BUN)    Collection Time: 03/30/22  4:19 PM   Result Value Ref Range    Sodium 139 133 - 144 mmol/L    Potassium 4.4 3.4 - 5.3 mmol/L    Chloride 103 94 - 109 mmol/L    Carbon Dioxide (CO2) 29 20 - 32 mmol/L    Anion Gap 7 3 - 14 mmol/L    Urea Nitrogen 19 7 - 30 mg/dL    Creatinine 1.37 (H) 0.66 - 1.25 mg/dL    Calcium 9.5 8.5 - 10.1 mg/dL    Glucose 104 (H) 70 - 99 mg/dL    GFR Estimate 64 >60 mL/min/1.73m2   CBC with platelets    Collection Time: 03/30/22  4:19 PM   Result Value Ref Range    WBC Count 8.3 4.0 - 11.0 10e3/uL    RBC Count 5.88 4.40 - 5.90 10e6/uL    Hemoglobin 19.3 (H) 13.3 - 17.7 g/dL    Hematocrit 53.4 (H) 40.0 - 53.0 %    MCV 91 78 - 100 fL    MCH 32.8 26.5 - 33.0 pg    MCHC 36.1 31.5 - 36.5 g/dL    RDW 13.9 10.0 - 15.0 %    Platelet Count 215 150 - 450 10e3/uL   Extra Red Top Tube    Collection Time: 04/01/22  2:25 PM   Result Value Ref Range    Hold Specimen JIC      Recent Results (from the past 720 hour(s))   Basic metabolic panel  (Ca, Cl, CO2, Creat, Gluc, K, Na, BUN)    Collection Time: 03/30/22  4:19 PM   Result Value Ref Range    Sodium 139 133 - 144 mmol/L    Potassium 4.4 3.4 - 5.3 mmol/L    Chloride 103 94 - 109 mmol/L    Carbon Dioxide (CO2) 29 20 - 32 mmol/L    Anion Gap 7 3 - 14 mmol/L    Urea Nitrogen 19 7 - 30 mg/dL     Creatinine 1.37 (H) 0.66 - 1.25 mg/dL    Calcium 9.5 8.5 - 10.1 mg/dL    Glucose 104 (H) 70 - 99 mg/dL    GFR Estimate 64 >60 mL/min/1.73m2   CBC with platelets    Collection Time: 03/30/22  4:19 PM   Result Value Ref Range    WBC Count 8.3 4.0 - 11.0 10e3/uL    RBC Count 5.88 4.40 - 5.90 10e6/uL    Hemoglobin 19.3 (H) 13.3 - 17.7 g/dL    Hematocrit 53.4 (H) 40.0 - 53.0 %    MCV 91 78 - 100 fL    MCH 32.8 26.5 - 33.0 pg    MCHC 36.1 31.5 - 36.5 g/dL    RDW 13.9 10.0 - 15.0 %    Platelet Count 215 150 - 450 10e3/uL   Extra Red Top Tube    Collection Time: 04/01/22  2:25 PM   Result Value Ref Range    Hold Specimen JIC       No EKG required, no history of coronary heart disease, significant arrhythmia, peripheral arterial disease or other structural heart disease.    Revised Cardiac Risk Index (RCRI):  The patient has the following serious cardiovascular risks for perioperative complications:   - No serious cardiac risks = 0 points     RCRI Interpretation: 0 points: Class I (very low risk - 0.4% complication rate)           Signed Electronically by: Drea Husain MD  Copy of this evaluation report is provided to requesting physician.

## 2022-03-30 NOTE — PATIENT INSTRUCTIONS
Osbaldo Lozada,    Thank you for allowing [facility] to manage your care.    I ordered some blood work, please go to the laboratory to get your laboratory studies.    For your convenience, test results are released as soon as they are available  Please allow 1-2 business days for me to send you a comment about your results.  If not done so, I encourage you to login into [employer] ([email or web address] to review your results in real time.     If you have any questions or concerns, please feel free to call us at [phone].    Sincerely,    Dr. Husain    Did you know?      You can schedule a video visit for follow-up appointments as well as future appointments for certain conditions.  Please see the below link.     [email or web address]    If you have not already done so,  I encourage you to sign up for [employer] ([email or web address]).  This will allow you to review your results, securely communicate with a provider, and schedule virtual visits as well.

## 2022-03-30 NOTE — H&P (VIEW-ONLY)
[facility] [place]  [address] [address]  [place] [address]  Phone: [phone]  Primary Provider: Haile Tam  Pre-op Performing Provider: BEAR ANDREA    {Provider  Link to PREOP SmartSet  Use this to apply standard patient instructions to AVS; includes medication directions, common orders, guidelines for anemia, warfarin, additional testing   :[id number]}  PREOPERATIVE EVALUATION:  Today's date: 3/30/2022    Neville Magallanes is a 46 year old male who presents for a preoperative evaluation.    Surgical Information:  Surgery/Procedure: Reconstruction, R Knee, Anterior Cruciate Ligament, Arthroscopic with hamstring autograft, possible meniscus repair versus debridement, possible chondral debridement    Surgery Location: [facility]   Surgeon: Jignesh  Surgery Date: 4/7/22  Time of Surgery: 7am   Where patient plans to recover: At home with family  Fax number for surgical facility: Note does not need to be faxed, will be available electronically in Epic.    Type of Anesthesia Anticipated: General    Assessment & Plan     The proposed surgical procedure is considered INTERMEDIATE risk.  1. Preoperative examination    - Basic metabolic panel  (Ca, Cl, CO2, Creat, Gluc, K, Na, BUN); Future  - CBC with platelets; Future  - Basic metabolic panel  (Ca, Cl, CO2, Creat, Gluc, K, Na, BUN)  - CBC with platelets    2. Rupture of anterior cruciate ligament of right knee, subsequent encounter    - Basic metabolic panel  (Ca, Cl, CO2, Creat, Gluc, K, Na, BUN); Future  - CBC with platelets; Future  - Basic metabolic panel  (Ca, Cl, CO2, Creat, Gluc, K, Na, BUN)  - CBC with platelets        4. Essential hypertension  Controlled  Discontinue Lisinopril due to elevated cr and start amlodipine.  Recheck cr after surgery  5. Hypotestosteronism  Hold testosterone  Make follow up appointment after the surgery to further evaluate regimen         Risks and Recommendations:  The patient has  the following additional risks and recommendations for perioperative complications:  Cr is slightly elevated. Recommend avoiding nephrotoxins e.g NSAIDs.    hematocrit and Hemoglobin are elevated: recommend to hold Testoerone starting from today      RECOMMENDATION:  APPROVAL GIVEN to proceed with proposed procedure, without further diagnostic evaluation.    [id number]}    Subjective     HPI related to upcoming procedure: ruptured of the right acl causing pain.    Preop Questions 3/30/2022   1. Have you ever had a heart attack or stroke? No   2. Have you ever had surgery on your heart or blood vessels, such as a stent placement, a coronary artery bypass, or surgery on an artery in your head, neck, heart, or legs? No   3. Do you have chest pain with activity? No   4. Do you have a history of  heart failure? No   5. Do you currently have a cold, bronchitis or symptoms of other infection? No   6. Do you have a cough, shortness of breath, or wheezing? No   7. Do you or anyone in your family have previous history of blood clots? No   8. Do you or does anyone in your family have a serious bleeding problem such as prolonged bleeding following surgeries or cuts? No   9. Have you ever had problems with anemia or been told to take iron pills? No   10. Have you had any abnormal blood loss such as black, tarry or bloody stools? No   11. Have you ever had a blood transfusion? No   12. Are you willing to have a blood transfusion if it is medically needed before, during, or after your surgery? Yes   13. Have you or any of your relatives ever had problems with anesthesia? No   14. Do you have sleep apnea, excessive snoring or daytime drowsiness? No   15. Do you have any artifical heart valves or other implanted medical devices like a pacemaker, defibrillator, or continuous glucose monitor? No   16. Do you have artificial joints? No   17. Are you allergic to latex? No       Health Care Directive:  Patient does not have a Health Care  Directive or Living Will: Discussed advance care planning with patient; however, patient declined at this time.    PreStatus of Chronic Conditions:    Hypertension is controlled on lisinopril  Hypotestorone: he is on testosterone    Review of Systems  CONSTITUTIONAL: NEGATIVE for fever, chills, change in weight  ENT/MOUTH: NEGATIVE for ear, mouth and throat problems  RESP: NEGATIVE for significant cough or SOB  CV: NEGATIVE for chest pain, palpitations or peripheral edema    Patient Active Problem List    Diagnosis Date Noted     Rupture of anterior cruciate ligament of right knee, subsequent encounter 02/07/2022     Priority: Medium     Added automatically from request for surgery [id number]       Uncontrolled hypertension 05/04/2021     Priority: Medium     Former smoker 05/04/2021     Priority: Medium     Hyperlipidemia LDL goal <130 12/30/2016     Priority: Medium     History of anabolic steroid use 11/28/2016     Priority: Medium     Hypotestosteronism 11/28/2016     Priority: Medium      Past Medical History:   Diagnosis Date     Uncontrolled hypertension 5/4/2021     Past Surgical History:   Procedure Laterality Date     ARTHROSCOPIC RECONSTRUCTION ANTERIOR CRUCIATE LIGAMENT Left 5/5/2016    Procedure: ARTHROSCOPIC RECONSTRUCTION ANTERIOR CRUCIATE LIGAMENT;  Surgeon: Casey Egan MD;  Location: [facility] OR     Current Outpatient Medications   Medication Sig Dispense Refill     lisinopril (ZESTRIL) 10 MG tablet Take 1 tablet (10 mg) by mouth daily 90 tablet 3     testosterone cypionate (DEPOTESTOSTERONE) 100 MG/ML injection INJECT 0.75 MLS (75 MG) INTO THE MUSCLE ONCE A WEEK 3 mL 0       No Known Allergies     Social History     Tobacco Use     Smoking status: Former Smoker     Packs/day: 0.00     Years: 0.00     Pack years: 0.00     Smokeless tobacco: Never Used   Substance Use Topics     Alcohol use: Yes     Alcohol/week: 0.0 standard drinks     Comment: social     Family History   Problem Relation Age of Onset  "    Hypertension Maternal Grandfather      History   Drug Use No         Objective     BP (!) 141/84   Pulse 89   Temp 98  F (36.7  C) (Tympanic)   Resp 20   Ht 1.702 m (5' 7\")   Wt 95.8 kg (211 lb 2 oz)   SpO2 96%   BMI 33.07 kg/m      Physical Exam    GENERAL APPEARANCE: healthy, alert and no distress     EYES: EOMI,  PERRL     HENT: ear canals and TM's normal and nose and mouth without ulcers or lesions, mallampati 1     NECK: no adenopathy, no asymmetry, masses, or scars and thyroid normal to palpation     RESP: lungs clear to auscultation - no rales, rhonchi or wheezes     CV: regular rates and rhythm, normal S1 S2, no S3 or S4 and no murmur, click or rub     ABDOMEN:  soft, nontender, no HSM or masses and bowel sounds normal     MS: extremities normal- no gross deformities noted, no evidence of inflammation in joints, FROM in all extremities.     SKIN: no suspicious lesions or rashes     NEURO: Normal strength and tone, sensory exam grossly normal, mentation intact and speech normal     PSYCH: mentation appears normal. and affect normal/bright     LYMPHATICS: No cervical adenopathy    Recent Labs   Lab Test 05/04/21  1352      POTASSIUM 4.1   CR 1.27*        Diagnostics:  Recent Results (from the past 24 hour(s))   Extra Red Top Tube    Collection Time: 04/01/22  2:25 PM   Result Value Ref Range    Hold Specimen JIC      Recent Results (from the past 48 hour(s))   Extra Red Top Tube    Collection Time: 04/01/22  2:25 PM   Result Value Ref Range    Hold Specimen JIC      Recent Results (from the past 168 hour(s))   Basic metabolic panel  (Ca, Cl, CO2, Creat, Gluc, K, Na, BUN)    Collection Time: 03/30/22  4:19 PM   Result Value Ref Range    Sodium 139 133 - 144 mmol/L    Potassium 4.4 3.4 - 5.3 mmol/L    Chloride 103 94 - 109 mmol/L    Carbon Dioxide (CO2) 29 20 - 32 mmol/L    Anion Gap 7 3 - 14 mmol/L    Urea Nitrogen 19 7 - 30 mg/dL    Creatinine 1.37 (H) 0.66 - 1.25 mg/dL    Calcium 9.5 8.5 - " 10.1 mg/dL    Glucose 104 (H) 70 - 99 mg/dL    GFR Estimate 64 >60 mL/min/1.73m2   CBC with platelets    Collection Time: 03/30/22  4:19 PM   Result Value Ref Range    WBC Count 8.3 4.0 - 11.0 10e3/uL    RBC Count 5.88 4.40 - 5.90 10e6/uL    Hemoglobin 19.3 (H) 13.3 - 17.7 g/dL    Hematocrit 53.4 (H) 40.0 - 53.0 %    MCV 91 78 - 100 fL    MCH 32.8 26.5 - 33.0 pg    MCHC 36.1 31.5 - 36.5 g/dL    RDW 13.9 10.0 - 15.0 %    Platelet Count 215 150 - 450 10e3/uL   Extra Red Top Tube    Collection Time: 04/01/22  2:25 PM   Result Value Ref Range    Hold Specimen JIC      Recent Results (from the past 240 hour(s))   Basic metabolic panel  (Ca, Cl, CO2, Creat, Gluc, K, Na, BUN)    Collection Time: 03/30/22  4:19 PM   Result Value Ref Range    Sodium 139 133 - 144 mmol/L    Potassium 4.4 3.4 - 5.3 mmol/L    Chloride 103 94 - 109 mmol/L    Carbon Dioxide (CO2) 29 20 - 32 mmol/L    Anion Gap 7 3 - 14 mmol/L    Urea Nitrogen 19 7 - 30 mg/dL    Creatinine 1.37 (H) 0.66 - 1.25 mg/dL    Calcium 9.5 8.5 - 10.1 mg/dL    Glucose 104 (H) 70 - 99 mg/dL    GFR Estimate 64 >60 mL/min/1.73m2   CBC with platelets    Collection Time: 03/30/22  4:19 PM   Result Value Ref Range    WBC Count 8.3 4.0 - 11.0 10e3/uL    RBC Count 5.88 4.40 - 5.90 10e6/uL    Hemoglobin 19.3 (H) 13.3 - 17.7 g/dL    Hematocrit 53.4 (H) 40.0 - 53.0 %    MCV 91 78 - 100 fL    MCH 32.8 26.5 - 33.0 pg    MCHC 36.1 31.5 - 36.5 g/dL    RDW 13.9 10.0 - 15.0 %    Platelet Count 215 150 - 450 10e3/uL   Extra Red Top Tube    Collection Time: 04/01/22  2:25 PM   Result Value Ref Range    Hold Specimen JIC      Recent Results (from the past 720 hour(s))   Basic metabolic panel  (Ca, Cl, CO2, Creat, Gluc, K, Na, BUN)    Collection Time: 03/30/22  4:19 PM   Result Value Ref Range    Sodium 139 133 - 144 mmol/L    Potassium 4.4 3.4 - 5.3 mmol/L    Chloride 103 94 - 109 mmol/L    Carbon Dioxide (CO2) 29 20 - 32 mmol/L    Anion Gap 7 3 - 14 mmol/L    Urea Nitrogen 19 7 - 30 mg/dL     Creatinine 1.37 (H) 0.66 - 1.25 mg/dL    Calcium 9.5 8.5 - 10.1 mg/dL    Glucose 104 (H) 70 - 99 mg/dL    GFR Estimate 64 >60 mL/min/1.73m2   CBC with platelets    Collection Time: 03/30/22  4:19 PM   Result Value Ref Range    WBC Count 8.3 4.0 - 11.0 10e3/uL    RBC Count 5.88 4.40 - 5.90 10e6/uL    Hemoglobin 19.3 (H) 13.3 - 17.7 g/dL    Hematocrit 53.4 (H) 40.0 - 53.0 %    MCV 91 78 - 100 fL    MCH 32.8 26.5 - 33.0 pg    MCHC 36.1 31.5 - 36.5 g/dL    RDW 13.9 10.0 - 15.0 %    Platelet Count 215 150 - 450 10e3/uL   Extra Red Top Tube    Collection Time: 04/01/22  2:25 PM   Result Value Ref Range    Hold Specimen JIC       No EKG required, no history of coronary heart disease, significant arrhythmia, peripheral arterial disease or other structural heart disease.    Revised Cardiac Risk Index (RCRI):  The patient has the following serious cardiovascular risks for perioperative complications:   - No serious cardiac risks = 0 points     RCRI Interpretation: 0 points: Class I (very low risk - 0.4% complication rate)           Signed Electronically by: Drea Husain MD  Copy of this evaluation report is provided to requesting physician.

## 2022-03-31 ENCOUNTER — DOCUMENTATION ONLY (OUTPATIENT)
Dept: LAB | Facility: CLINIC | Age: 47
End: 2022-03-31
Payer: COMMERCIAL

## 2022-03-31 DIAGNOSIS — E78.5 HYPERLIPIDEMIA LDL GOAL <130: ICD-10-CM

## 2022-03-31 DIAGNOSIS — I10 UNCONTROLLED HYPERTENSION: ICD-10-CM

## 2022-03-31 DIAGNOSIS — E34.9 HYPOTESTOSTERONISM: Primary | ICD-10-CM

## 2022-03-31 NOTE — PROGRESS NOTES
Hi! Please place future Testosterone laboratory orders if needed for upcoming appointment on 4/1/22. If labs are not due, please have your care team contact the patient to cancel lab only appointment.     Thank you!  [facility]

## 2022-04-01 ENCOUNTER — LAB (OUTPATIENT)
Dept: LAB | Facility: CLINIC | Age: 47
End: 2022-04-01
Payer: COMMERCIAL

## 2022-04-01 ENCOUNTER — DOCUMENTATION ONLY (OUTPATIENT)
Dept: LAB | Facility: CLINIC | Age: 47
End: 2022-04-01

## 2022-04-01 ENCOUNTER — MYC REFILL (OUTPATIENT)
Dept: FAMILY MEDICINE | Facility: CLINIC | Age: 47
End: 2022-04-01

## 2022-04-01 DIAGNOSIS — E78.5 HYPERLIPIDEMIA LDL GOAL <130: ICD-10-CM

## 2022-04-01 DIAGNOSIS — E34.9 HYPOTESTOSTERONISM: ICD-10-CM

## 2022-04-01 DIAGNOSIS — I10 UNCONTROLLED HYPERTENSION: ICD-10-CM

## 2022-04-01 LAB — HOLD SPECIMEN: NORMAL

## 2022-04-01 PROCEDURE — 80061 LIPID PANEL: CPT

## 2022-04-01 PROCEDURE — 84403 ASSAY OF TOTAL TESTOSTERONE: CPT

## 2022-04-01 PROCEDURE — 36415 COLL VENOUS BLD VENIPUNCTURE: CPT

## 2022-04-01 NOTE — PROGRESS NOTES
Patient was in today for his Testosterone lab but has no order. Please place lab order if needed.    ThanksJai

## 2022-04-04 ENCOUNTER — TELEPHONE (OUTPATIENT)
Dept: FAMILY MEDICINE | Facility: CLINIC | Age: 47
End: 2022-04-04

## 2022-04-04 ENCOUNTER — LAB (OUTPATIENT)
Dept: LAB | Facility: CLINIC | Age: 47
End: 2022-04-04
Payer: COMMERCIAL

## 2022-04-04 DIAGNOSIS — Z11.59 ENCOUNTER FOR SCREENING FOR OTHER VIRAL DISEASES: ICD-10-CM

## 2022-04-04 PROCEDURE — U0003 INFECTIOUS AGENT DETECTION BY NUCLEIC ACID (DNA OR RNA); SEVERE ACUTE RESPIRATORY SYNDROME CORONAVIRUS 2 (SARS-COV-2) (CORONAVIRUS DISEASE [COVID-19]), AMPLIFIED PROBE TECHNIQUE, MAKING USE OF HIGH THROUGHPUT TECHNOLOGIES AS DESCRIBED BY CMS-2020-01-R: HCPCS

## 2022-04-04 PROCEDURE — U0005 INFEC AGEN DETEC AMPLI PROBE: HCPCS

## 2022-04-04 RX ORDER — TESTOSTERONE CYPIONATE 1000 MG/10ML
INJECTION, SOLUTION INTRAMUSCULAR
Qty: 3 ML | Refills: 0 | OUTPATIENT
Start: 2022-04-04

## 2022-04-04 RX ORDER — AMLODIPINE BESYLATE 5 MG/1
5 TABLET ORAL DAILY
Qty: 30 TABLET | Refills: 4 | Status: SHIPPED | OUTPATIENT
Start: 2022-04-04 | End: 2022-06-09

## 2022-04-04 NOTE — PROGRESS NOTES
This request was in regards to 4/1/22 lab only visit.    See note from Haile 10 minutes ago:    Haile Tam PA-C  Be-Steven Mancos 10 minutes ago (12:51 PM)     KO       Orders entered. Patient to be fasting.            I see patient has covid lab appt today.      Routed to  pool to reach out to patient to schedule fasting lab visit.    Gosia oHwe RN  [facility]

## 2022-04-04 NOTE — TELEPHONE ENCOUNTER
I called patient, he points out someone already spoke to him about this.   Indeed, I see result note attached per other RN, copied here:      Spoke with patient and informed him per Dr Lubin, see below read word for word.   1.Patient in agreement to switching lisinopril to amlodipine.  Will let provider know so that she can remove lisinopril from med list.  2. Patient does not see Haile Tam anymore and asked that we remove him as PCP and put Dr Lubin down as PCP which nurse did as requested.  That being said patient does not believe his testosterone is causing his hemoglobin and hematocrit to be elevated.  Patient thinks it is due to him not drinking enough water.  Patient does not want to stop testosterone due to the effects of his testosterone being low.  Please advise.        Neville also thinks his elevated creatinine could be the result of him working out to build muscle and taking a creatinine supplement daily.    Routed to Dr. Lubin to address, patient does not plan to stop the testosterone, I see there is a refill request for that med in [facility] as well.    Gosia Howe RN  [facility]

## 2022-04-04 NOTE — TELEPHONE ENCOUNTER
----- Message from Drea Husain MD sent at 4/4/2022  9:20 AM CDT -----  Please call patient the following    Neville,    Your creatinine which is your kidney function is elevated at 1.37. I recommend stopping your lisinopril and avoid Nsaids for now. I will start you on another blood pressure medication called amlodipine 5 mg.    Your hemoglobin and Hematocrit are elevated, this is due to to your Testerone medication. I recommend holding it for now. Make an appointment to meet with your  AXEL Dobbs . To further discuss and evaluate your Testerone regimen    Please call me with any questions or concerns.        Drea Husain MD,MPH   Yes

## 2022-04-04 NOTE — PROGRESS NOTES
Patient is scheduled to have labs on 04/04/22, the [place] lab is going to notify the patient of the orders in his chart and have him schedule if he is not fasting today. Keeping encounter open for now.

## 2022-04-05 ENCOUNTER — ANESTHESIA EVENT (OUTPATIENT)
Dept: SURGERY | Facility: AMBULATORY SURGERY CENTER | Age: 47
End: 2022-04-05
Payer: COMMERCIAL

## 2022-04-05 LAB
CHOLEST SERPL-MCNC: 259 MG/DL
FASTING STATUS PATIENT QL REPORTED: ABNORMAL
HDLC SERPL-MCNC: 24 MG/DL
LDLC SERPL CALC-MCNC: 200 MG/DL
NONHDLC SERPL-MCNC: 235 MG/DL
SARS-COV-2 RNA RESP QL NAA+PROBE: NEGATIVE
TRIGL SERPL-MCNC: 177 MG/DL

## 2022-04-05 RX ORDER — NALOXONE HYDROCHLORIDE 0.4 MG/ML
0.2 INJECTION, SOLUTION INTRAMUSCULAR; INTRAVENOUS; SUBCUTANEOUS
Status: DISCONTINUED | OUTPATIENT
Start: 2022-04-05 | End: 2022-04-08 | Stop reason: HOSPADM

## 2022-04-05 RX ORDER — NALOXONE HYDROCHLORIDE 0.4 MG/ML
0.4 INJECTION, SOLUTION INTRAMUSCULAR; INTRAVENOUS; SUBCUTANEOUS
Status: DISCONTINUED | OUTPATIENT
Start: 2022-04-05 | End: 2022-04-08 | Stop reason: HOSPADM

## 2022-04-07 ENCOUNTER — ANCILLARY PROCEDURE (OUTPATIENT)
Dept: GENERAL RADIOLOGY | Facility: CLINIC | Age: 47
End: 2022-04-07
Attending: ORTHOPAEDIC SURGERY
Payer: COMMERCIAL

## 2022-04-07 ENCOUNTER — HOSPITAL ENCOUNTER (OUTPATIENT)
Facility: AMBULATORY SURGERY CENTER | Age: 47
Discharge: HOME | End: 2022-04-07
Attending: ORTHOPAEDIC SURGERY | Admitting: ORTHOPAEDIC SURGERY
Payer: COMMERCIAL

## 2022-04-07 ENCOUNTER — ANESTHESIA (OUTPATIENT)
Dept: SURGERY | Facility: AMBULATORY SURGERY CENTER | Age: 47
End: 2022-04-07
Payer: COMMERCIAL

## 2022-04-07 VITALS
TEMPERATURE: 97.8 F | OXYGEN SATURATION: 93 % | HEART RATE: 73 BPM | DIASTOLIC BLOOD PRESSURE: 70 MMHG | RESPIRATION RATE: 18 BRPM | SYSTOLIC BLOOD PRESSURE: 119 MMHG

## 2022-04-07 DIAGNOSIS — R52 PAIN: ICD-10-CM

## 2022-04-07 DIAGNOSIS — S83.511D RUPTURE OF ANTERIOR CRUCIATE LIGAMENT OF RIGHT KNEE, SUBSEQUENT ENCOUNTER: Primary | ICD-10-CM

## 2022-04-07 LAB — TESTOST SERPL-MCNC: 150 NG/DL (ref 240–950)

## 2022-04-07 PROCEDURE — G8918 PT W/O PREOP ORDER IV AB PRO: HCPCS

## 2022-04-07 PROCEDURE — 29888 ARTHRS AID ACL RPR/AGMNTJ: CPT | Mod: AS | Performed by: PHYSICIAN ASSISTANT

## 2022-04-07 PROCEDURE — G8907 PT DOC NO EVENTS ON DISCHARG: HCPCS

## 2022-04-07 PROCEDURE — 29888 ARTHRS AID ACL RPR/AGMNTJ: CPT | Mod: RT | Performed by: ORTHOPAEDIC SURGERY

## 2022-04-07 PROCEDURE — 29888 ARTHRS AID ACL RPR/AGMNTJ: CPT | Mod: RT

## 2022-04-07 PROCEDURE — C1713 ANCHOR/SCREW BN/BN,TIS/BN: HCPCS

## 2022-04-07 DEVICE — IMP FIXATION DEVICE SNR ARTHRO ADJ ULTRABUTTON 72290003: Type: IMPLANTABLE DEVICE | Site: KNEE | Status: FUNCTIONAL

## 2022-04-07 DEVICE — IMPLANTABLE DEVICE: Type: IMPLANTABLE DEVICE | Site: KNEE | Status: FUNCTIONAL

## 2022-04-07 RX ORDER — ONDANSETRON 4 MG/1
4 TABLET, ORALLY DISINTEGRATING ORAL
Status: DISCONTINUED | OUTPATIENT
Start: 2022-04-07 | End: 2022-04-08 | Stop reason: HOSPADM

## 2022-04-07 RX ORDER — FENTANYL CITRATE 50 UG/ML
25 INJECTION, SOLUTION INTRAMUSCULAR; INTRAVENOUS
Status: DISCONTINUED | OUTPATIENT
Start: 2022-04-07 | End: 2022-04-08 | Stop reason: HOSPADM

## 2022-04-07 RX ORDER — HYDRALAZINE HYDROCHLORIDE 20 MG/ML
2.5-5 INJECTION INTRAMUSCULAR; INTRAVENOUS EVERY 10 MIN PRN
Status: DISCONTINUED | OUTPATIENT
Start: 2022-04-07 | End: 2022-04-08 | Stop reason: HOSPADM

## 2022-04-07 RX ORDER — PROPOFOL 10 MG/ML
INJECTION, EMULSION INTRAVENOUS PRN
Status: DISCONTINUED | OUTPATIENT
Start: 2022-04-07 | End: 2022-04-07

## 2022-04-07 RX ORDER — FENTANYL CITRATE 50 UG/ML
25 INJECTION, SOLUTION INTRAMUSCULAR; INTRAVENOUS EVERY 5 MIN PRN
Status: DISCONTINUED | OUTPATIENT
Start: 2022-04-07 | End: 2022-04-08 | Stop reason: HOSPADM

## 2022-04-07 RX ORDER — ACETAMINOPHEN 325 MG/1
975 TABLET ORAL ONCE
Status: COMPLETED | OUTPATIENT
Start: 2022-04-07 | End: 2022-04-07

## 2022-04-07 RX ORDER — LIDOCAINE HYDROCHLORIDE 20 MG/ML
INJECTION, SOLUTION INFILTRATION; PERINEURAL PRN
Status: DISCONTINUED | OUTPATIENT
Start: 2022-04-07 | End: 2022-04-07

## 2022-04-07 RX ORDER — METHOCARBAMOL 750 MG/1
750 TABLET, FILM COATED ORAL
Status: DISCONTINUED | OUTPATIENT
Start: 2022-04-07 | End: 2022-04-08 | Stop reason: HOSPADM

## 2022-04-07 RX ORDER — DEXAMETHASONE SODIUM PHOSPHATE 4 MG/ML
INJECTION, SOLUTION INTRA-ARTICULAR; INTRALESIONAL; INTRAMUSCULAR; INTRAVENOUS; SOFT TISSUE PRN
Status: DISCONTINUED | OUTPATIENT
Start: 2022-04-07 | End: 2022-04-07

## 2022-04-07 RX ORDER — CEFAZOLIN SODIUM 2 G/100ML
2 INJECTION, SOLUTION INTRAVENOUS
Status: DISCONTINUED | OUTPATIENT
Start: 2022-04-07 | End: 2022-04-08 | Stop reason: HOSPADM

## 2022-04-07 RX ORDER — FENTANYL CITRATE 50 UG/ML
25-50 INJECTION, SOLUTION INTRAMUSCULAR; INTRAVENOUS
Status: DISCONTINUED | OUTPATIENT
Start: 2022-04-07 | End: 2022-04-08 | Stop reason: HOSPADM

## 2022-04-07 RX ORDER — KETOROLAC TROMETHAMINE 30 MG/ML
15 INJECTION, SOLUTION INTRAMUSCULAR; INTRAVENOUS EVERY 6 HOURS PRN
Status: DISCONTINUED | OUTPATIENT
Start: 2022-04-07 | End: 2022-04-08 | Stop reason: HOSPADM

## 2022-04-07 RX ORDER — HYDROXYZINE HYDROCHLORIDE 25 MG/1
25 TABLET, FILM COATED ORAL
Status: DISCONTINUED | OUTPATIENT
Start: 2022-04-07 | End: 2022-04-08 | Stop reason: HOSPADM

## 2022-04-07 RX ORDER — AMOXICILLIN 250 MG
1-2 CAPSULE ORAL 2 TIMES DAILY
Qty: 30 TABLET | Refills: 0 | Status: SHIPPED | OUTPATIENT
Start: 2022-04-07 | End: 2022-08-11

## 2022-04-07 RX ORDER — LIDOCAINE 40 MG/G
CREAM TOPICAL
Status: DISCONTINUED | OUTPATIENT
Start: 2022-04-07 | End: 2022-04-08 | Stop reason: HOSPADM

## 2022-04-07 RX ORDER — MEPERIDINE HYDROCHLORIDE 25 MG/ML
12.5 INJECTION INTRAMUSCULAR; INTRAVENOUS; SUBCUTANEOUS
Status: DISCONTINUED | OUTPATIENT
Start: 2022-04-07 | End: 2022-04-08 | Stop reason: HOSPADM

## 2022-04-07 RX ORDER — ACETAMINOPHEN 500 MG
1000 TABLET ORAL EVERY 8 HOURS
Qty: 42 TABLET | Refills: 0 | Status: SHIPPED | OUTPATIENT
Start: 2022-04-07 | End: 2022-04-14

## 2022-04-07 RX ORDER — SODIUM CHLORIDE, SODIUM LACTATE, POTASSIUM CHLORIDE, CALCIUM CHLORIDE 600; 310; 30; 20 MG/100ML; MG/100ML; MG/100ML; MG/100ML
INJECTION, SOLUTION INTRAVENOUS CONTINUOUS
Status: DISCONTINUED | OUTPATIENT
Start: 2022-04-07 | End: 2022-04-08 | Stop reason: HOSPADM

## 2022-04-07 RX ORDER — ALBUTEROL SULFATE 0.83 MG/ML
2.5 SOLUTION RESPIRATORY (INHALATION) EVERY 4 HOURS PRN
Status: DISCONTINUED | OUTPATIENT
Start: 2022-04-07 | End: 2022-04-08 | Stop reason: HOSPADM

## 2022-04-07 RX ORDER — PROPOFOL 10 MG/ML
INJECTION, EMULSION INTRAVENOUS CONTINUOUS PRN
Status: DISCONTINUED | OUTPATIENT
Start: 2022-04-07 | End: 2022-04-07

## 2022-04-07 RX ORDER — LORAZEPAM 2 MG/ML
.5-1 INJECTION INTRAMUSCULAR
Status: DISCONTINUED | OUTPATIENT
Start: 2022-04-07 | End: 2022-04-08 | Stop reason: HOSPADM

## 2022-04-07 RX ORDER — OXYCODONE HYDROCHLORIDE 5 MG/1
5 TABLET ORAL EVERY 4 HOURS PRN
Status: DISCONTINUED | OUTPATIENT
Start: 2022-04-07 | End: 2022-04-08 | Stop reason: HOSPADM

## 2022-04-07 RX ORDER — ONDANSETRON 2 MG/ML
INJECTION INTRAMUSCULAR; INTRAVENOUS PRN
Status: DISCONTINUED | OUTPATIENT
Start: 2022-04-07 | End: 2022-04-07

## 2022-04-07 RX ORDER — KETAMINE HYDROCHLORIDE 10 MG/ML
INJECTION INTRAMUSCULAR; INTRAVENOUS PRN
Status: DISCONTINUED | OUTPATIENT
Start: 2022-04-07 | End: 2022-04-07

## 2022-04-07 RX ORDER — ONDANSETRON 2 MG/ML
4 INJECTION INTRAMUSCULAR; INTRAVENOUS EVERY 30 MIN PRN
Status: DISCONTINUED | OUTPATIENT
Start: 2022-04-07 | End: 2022-04-08 | Stop reason: HOSPADM

## 2022-04-07 RX ORDER — LABETALOL HYDROCHLORIDE 5 MG/ML
10 INJECTION, SOLUTION INTRAVENOUS
Status: DISCONTINUED | OUTPATIENT
Start: 2022-04-07 | End: 2022-04-08 | Stop reason: HOSPADM

## 2022-04-07 RX ORDER — METHOCARBAMOL 500 MG/1
1000 TABLET, FILM COATED ORAL 3 TIMES DAILY PRN
Qty: 60 TABLET | Refills: 1 | Status: SHIPPED | OUTPATIENT
Start: 2022-04-07 | End: 2022-08-11

## 2022-04-07 RX ORDER — ONDANSETRON 4 MG/1
4 TABLET, ORALLY DISINTEGRATING ORAL EVERY 30 MIN PRN
Status: DISCONTINUED | OUTPATIENT
Start: 2022-04-07 | End: 2022-04-08 | Stop reason: HOSPADM

## 2022-04-07 RX ORDER — BUPIVACAINE HYDROCHLORIDE 2.5 MG/ML
INJECTION, SOLUTION EPIDURAL; INFILTRATION; INTRACAUDAL
Status: COMPLETED | OUTPATIENT
Start: 2022-04-07 | End: 2022-04-07

## 2022-04-07 RX ORDER — FLUMAZENIL 0.1 MG/ML
0.2 INJECTION, SOLUTION INTRAVENOUS
Status: DISCONTINUED | OUTPATIENT
Start: 2022-04-07 | End: 2022-04-08 | Stop reason: HOSPADM

## 2022-04-07 RX ORDER — OXYCODONE HYDROCHLORIDE 5 MG/1
5-10 TABLET ORAL EVERY 6 HOURS PRN
Qty: 16 TABLET | Refills: 0 | Status: SHIPPED | OUTPATIENT
Start: 2022-04-07 | End: 2022-04-11

## 2022-04-07 RX ORDER — OXYCODONE HYDROCHLORIDE 5 MG/1
5 TABLET ORAL
Status: DISCONTINUED | OUTPATIENT
Start: 2022-04-07 | End: 2022-04-08 | Stop reason: HOSPADM

## 2022-04-07 RX ORDER — CEFAZOLIN SODIUM 2 G/100ML
2 INJECTION, SOLUTION INTRAVENOUS SEE ADMIN INSTRUCTIONS
Status: DISCONTINUED | OUTPATIENT
Start: 2022-04-07 | End: 2022-04-08 | Stop reason: HOSPADM

## 2022-04-07 RX ADMIN — LIDOCAINE HYDROCHLORIDE 100 MG: 20 INJECTION, SOLUTION INFILTRATION; PERINEURAL at 07:12

## 2022-04-07 RX ADMIN — FENTANYL CITRATE 25 MCG: 50 INJECTION, SOLUTION INTRAMUSCULAR; INTRAVENOUS at 07:12

## 2022-04-07 RX ADMIN — FENTANYL CITRATE 25 MCG: 50 INJECTION, SOLUTION INTRAMUSCULAR; INTRAVENOUS at 08:37

## 2022-04-07 RX ADMIN — PROPOFOL 200 MG: 10 INJECTION, EMULSION INTRAVENOUS at 07:12

## 2022-04-07 RX ADMIN — ONDANSETRON 4 MG: 2 INJECTION INTRAMUSCULAR; INTRAVENOUS at 07:25

## 2022-04-07 RX ADMIN — FENTANYL CITRATE 50 MCG: 50 INJECTION, SOLUTION INTRAMUSCULAR; INTRAVENOUS at 06:50

## 2022-04-07 RX ADMIN — BUPIVACAINE HYDROCHLORIDE 20 ML: 2.5 INJECTION, SOLUTION EPIDURAL; INFILTRATION; INTRACAUDAL at 06:55

## 2022-04-07 RX ADMIN — PROPOFOL 150 MCG/KG/MIN: 10 INJECTION, EMULSION INTRAVENOUS at 07:12

## 2022-04-07 RX ADMIN — OXYCODONE HYDROCHLORIDE 5 MG: 5 TABLET ORAL at 10:15

## 2022-04-07 RX ADMIN — DEXAMETHASONE SODIUM PHOSPHATE 4 MG: 4 INJECTION, SOLUTION INTRA-ARTICULAR; INTRALESIONAL; INTRAMUSCULAR; INTRAVENOUS; SOFT TISSUE at 07:25

## 2022-04-07 RX ADMIN — KETAMINE HYDROCHLORIDE 25 MG: 10 INJECTION INTRAMUSCULAR; INTRAVENOUS at 07:44

## 2022-04-07 RX ADMIN — SODIUM CHLORIDE, SODIUM LACTATE, POTASSIUM CHLORIDE, CALCIUM CHLORIDE: 600; 310; 30; 20 INJECTION, SOLUTION INTRAVENOUS at 06:36

## 2022-04-07 RX ADMIN — FENTANYL CITRATE 25 MCG: 50 INJECTION, SOLUTION INTRAMUSCULAR; INTRAVENOUS at 09:03

## 2022-04-07 RX ADMIN — FENTANYL CITRATE 25 MCG: 50 INJECTION, SOLUTION INTRAMUSCULAR; INTRAVENOUS at 07:49

## 2022-04-07 RX ADMIN — Medication 0.5 MG: at 07:50

## 2022-04-07 RX ADMIN — ACETAMINOPHEN 975 MG: 325 TABLET ORAL at 06:07

## 2022-04-07 NOTE — BRIEF OP NOTE
[facility] Brief Operative Note    Pre-operative diagnosis: Rupture of anterior cruciate ligament of right knee, subsequent encounter [V71.733A]   Post-operative diagnosis right knee chronic ACL tear     Procedure: Procedure(s):  RECONSTRUCTION, RIGHT KNEE, ANTERIOR CRUCIATE LIGAMENT, ARTHROSCOPIC WITH HAMSTRING AUTOGRAFT   Surgeon(s): Surgeon(s) and Role:     * Casey Egan MD - Primary     * Alex Read PA - Assisting   Estimated blood loss: 25 mL    Specimens: * No specimens in log *   Findings: Chronic anterior cruciate ligament tear, intact menisci.

## 2022-04-07 NOTE — DISCHARGE INSTRUCTIONS
1. Name: Neville Magallanes MRN #: [id number]  2. Date: 4/7/2022  Procedure: right knee anterior cruciate ligament reconstruction with hamstrings autograft.  3. Discharge to home when stable, tolerating clear liquids, and patient has urinated  4. Call for follow-up appointment, [phone], with Dr. Egan in:  2 weeks.   WOUND CARE    The bandage may be slightly bloody. This is normal.  5. Ice:  Keep an ice bag on your knee for 20 minutes at a time.  6. Keep incisions clean and dry following surgery for:  72 hours   7. Change all bandages in:  72 hours       8. If bandages are changed before follow-up, cover all incisions with fresh bandages or bandaids.  9. O.K. to shower (may get incision wet) in:  72 hours  10. No tub baths, swimming pools, hot tubs, etc. for a minimum of 2 weeks following surgery  ACTIVITY  11. Keep leg elevated on a pillow placed under ankle. Do not keep pillow under your knee.  12. Weight-bearing ([place]):  Weight-bear as tolerated in knee immobilizer.       May discontinue crutches in 2-3 days if able to walk without a limp.  13. Bracing: knee immobilizer day and night. Off for hygiene, exercises, Physical Therapy.  14. Range of motion limits: work to regain full knee range of motion.  15. Exercises:  Perform exercises 3 times a day for a minimum of 25 reps each time (start today or tomorrow):             Quadriceps sets  Calf Pumps Straight leg raises  Heels Slides   16. Start Physical Therapy: next week.  17. OK to drive:  Not until cleared by Dr. Egan since it is the right leg, your driving leg.    When going back to driving, be sure to test braking/acceleration maneuvers in an empty parking lot before entry into any traffic areas.      ABSOLUTELY NO DRIVING WHILE TAKING NARCOTICS!    DISCHARGE MEDICATIONS:   Aspirin 325 mg, 1 tablet, take once a day for 28 days then stop (to prevent blood clots) (over the counter)  Oxycodone 5 mg, 1 to 2 tablets, take every 6 hours as needed for pain,  based upon pain scores  Tylenol 500 mg, 2 tablets, take every 8 hours around the clock for 7 days, then as needed (over the counter)  Other: stool softeners while on pain medications  Ok to take over the counter medications such as ibuprofen or acetaminophen as needed.    Strong pain medication has been prescribed. Use as directed. Do not combine with alcohol. Be careful as you walk or climb stairs.   DIET:  If no nausea, clear liquids should be taken initially.  Then progress to solid foods when clear liquids are tolerated.   RESPONSE TO SURGERY: It is normal to have pain and swelling in your knee after surgery. It may take 4 weeks or longer for the swelling to go away. It is also common to notice some bruising around the knee, thigh, and calf as the swelling resolves.  EMERGENCY: Call or return for any fevers (temperature greater than 101.5   or sustained fevers greater than 100.5   that haven t resolved within 3 to 4 days following surgery) or chills, increasing pain, swelling, redness, calf pain, drainage (especially if yellow, green, or foul smelling), excessive bleeding), chest pain, shortness of breath:  Phone #: [phone]; If emergency, go to local ER or dial 911.    Casey Egan M.D., M.S.  Dept. of Orthopaedic Surgery  [facility]    4/7/2022        KNEE SURGERY - HOME EXERCISE PROGRAM    All exercises to be performed at least 3 times per day.     Quad Sets    Sit with leg extended    Tighten quad muscles in front of leg, trying to  push back of knee downward    Hold exercise for 10 seconds    Rest 10 seconds between reps    Perform 1 set of 20 reps, 3 times a day     Heel Slides     Lie on back with legs straight    Slide heel to buttocks     Return to start position    Repeat with other leg    Perform 1 rep every 4 seconds    Perform 3 sets of 20 reps, 3 times a day    Rest 1 minute between sets     Ankle Pumps     Lie on back with foot elevated on pillow    Move foot up and down,  pumping ankle    Perform 3 sets of 20 reps, 3 times a day    Perform 1 rep every 4 seconds    Rest 1 minute between sets     Straight Leg Raise    Lie on back with uninvolved knee bent    Raise straight leg to thigh level of bend leg    Return to starting position    Perform 3 sets of 20 reps, 3 times a day    Perform 1 rep every 4 seconds    Rest 1 minute between sets              Tylenol 975 mg was given at 6:05 am.    You should not take more then 4,000 mg of tylenol/acetaminophen in a 24 hour period.      [facility]   Adult Discharge Orders & Instructions     For 24 hours after surgery    1. Get plenty of rest.  A responsible adult must stay with you for at least 24 hours after you leave the hospital.   2. Do not drive or use heavy equipment.  If you have weakness or tingling, don't drive or use heavy equipment until this feeling goes away.  3. Do not drink alcohol.  4. Avoid strenuous or risky activities.  Ask for help when climbing stairs.   5. You may feel lightheaded.  IF so, sit for a few minutes before standing.  Have someone help you get up.   6. If you have nausea (feel sick to your stomach): Drink only clear liquids such as apple juice, ginger ale, broth or 7-Up.  Rest may also help.  Be sure to drink enough fluids.  Move to a regular diet as you feel able.  7. You may have a slight fever. Call the doctor if your fever is over 100 F (37.7 C) (taken under the tongue) or lasts longer than 24 hours.  8. You may have a dry mouth, a sore throat, muscle aches or trouble sleeping.  These should go away after 24 hours.  9. Do not make important or legal decisions.     Call your doctor for any of the followin.  Signs of infection (fever, growing tenderness at the surgery site, a large amount of drainage or bleeding, severe pain, foul-smelling drainage, redness, swelling).    2. It has been over 8 to 10 hours since surgery and you are still not able to urinate (pass water).    3.  Headache  "for over 24 hours.    4.  Numbness, tingling or weakness the day after surgery (if you had spinal anesthesia).                  5. Signs of Covid-19 infection (temperature over 100 degrees, shortness of breath, cough, loss of taste/smell, generalized body aches, persistent headache,                  chills, sore throat, nausea/vomiting/diarrhea).    To contact Dr Egan call:  [phone]    Information about liposomal bupivacaine (Exparel)    What is Liposomal Bupivacaine?    Liposomal Bupivacaine is a numbing medication that can help you manage your pain after surgery.  This medication is similar to \"novacaine,\" which is often used by the dentist.  Liposomal bupivacaine is released slowly and can help control pain for up to 72 hours.    What is the purpose of Liposomal Bupivacaine?    To manage your pain after surgery    To help you sleep better, take deep breaths, walk more comfortable, and feel up to visiting with others    How is the procedure done?    Liposomal bupivacaine is a medication given by an injection.    It is usually given right before your surgery.  If this is the case, you will be awake or sedated, but you should experience minimal pain during the procedure.    For some people, the injection may be given at the very end of your surgery.  It all depends on the type of surgery and your situation.    The procedure usually takes about 5-15 minutes.  An ultrasound machine will help the anesthesiologist insert it in the right place or the surgeon will inject it under direct vision.     A needle is used to place the numbing medication under your skin.  It provides pain relief by numbing the tissue in the area where your surgeon will make the incision.    What can I expect?    You may experience numbness, tingling, or a feeling of heaviness around the area that was injected.    If you experience any of the follow symptoms IMMEDIATELY CALL THE REGIONAL ANESTHESIA PAIN SERVICE:    Numbness or tingling " occurs in areas other than around the injection site    Blurry vision    Ringing in your ears    A metallic taste in your mouth    PAGE: Dial [phone].  When prompted, enter the following 4-digit ID number:  [id number].  You will be prompted to enter your phone number; and then enter the # sign.  The clinician on call will call you back.    OR    CALL: Dial [phone].  Let the hospital  know that you are having a problem with a nerve block and that you would like to speak to the regional anesthesia pain service right away.    You should not receive any other type of numbing medication within 4 days after receiving liposomal bupivacaine unless your anesthesiologist approves.    Post Operative Instructions: Regional Anesthetic for Lower Extremity with Liposomal Bupivacaine  General Information:   Regional anesthesia is when local anesthetic or  numbing  medication is injected around the nerves to anesthetize or  numb  the area supplied by that set of nerves. It is a type of analgesia used to control pain and decreases the need for narcotics following surgery.    Types of Regional Blocks:  Femoral: A block injected into the groin area of the operative leg of a patient having thigh or knee surgery.  Adductor Canal: A block injected into the mid thigh of the operative leg of a patient having knee or ankle surgery.  Popliteal or Distal Sciatic: A block injected into the back of the knee of the operative leg of patients having foot or ankle surgery.   Ankle:  An anesthetic medication is injected into the ankle of the operative leg of a patient having foot or toe surgery.     Procedure:   The type of anesthesia your doctor used to numb your leg will usually not wear off for 24-48 hours, but may last as long as 72 hours. You should be careful during that period, since it is possible to injure your leg without being aware of the injury. While your leg is numb you should:    Use crutches (minimal weight bearing until  your motor and strength is completely back to normal)    Avoid striking or bumping your leg    Avoid extreme hot or cold    Discomfort:  You will have a tingling and prickly sensation in your leg as the feeling begins to return; you can also expect some discomfort. The amount of discomfort is unpredictable, but if you have more pain than can be controlled with pain medication, you should notify your physician.     Pain Medicine:   Begin taking your oral pain pills before bedtime and during the night to avoid a sudden onset of pain as part of the block wears off. Do not engage in drinking, driving, or hazardous occupations while taking pain medication.

## 2022-04-07 NOTE — ANESTHESIA CARE TRANSFER NOTE
Patient: Neville Magallanes    Procedure: Procedure(s):  RECONSTRUCTION, RIGHT KNEE, ANTERIOR CRUCIATE LIGAMENT, ARTHROSCOPIC WITH HAMSTRING AUTOGRAFT       Diagnosis: Rupture of anterior cruciate ligament of right knee, subsequent encounter [S88.257D]  Diagnosis Additional Information: No value filed.    Anesthesia Type:   General     Note:    Oropharynx: oral airway in place  Level of Consciousness: drowsy  Oxygen Supplementation: face mask  Level of Supplemental Oxygen (L/min / FiO2): 8  Independent Airway: airway patency satisfactory and stable  Dentition: dentition unchanged  Vital Signs Stable: post-procedure vital signs reviewed and stable  Report to RN Given: handoff report given  Patient transferred to: PACU    Handoff Report: Identifed the Patient, Identified the Reponsible Provider, Reviewed the pertinent medical history, Discussed the surgical course, Reviewed Intra-OP anesthesia mangement and issues during anesthesia, Set expectations for post-procedure period and Allowed opportunity for questions and acknowledgement of understanding      Vitals:  Vitals Value Taken Time   /61 04/07/22 0945   Temp     Pulse 70 04/07/22 0947   Resp 15 04/07/22 0947   SpO2 95 % 04/07/22 0947   Vitals shown include unvalidated device data.    Electronically Signed By: PABLO Carreno CRNA  April 7, 2022  9:48 AM

## 2022-04-07 NOTE — OR NURSING
Pt received peripheral nerve block on Right lower extremity  in PreOP by Dr. Zamudio.  Received 1mg Versed and 50mcg Fentanyl for block. VSS. Tolerated procedure well.

## 2022-04-07 NOTE — INTERVAL H&P NOTE
"I have reviewed the surgical (or preoperative) H&P that is linked to this encounter, and examined the patient. There are no significant changes    Clinical Conditions Present on Arrival:  Clinically Significant Risk Factors Present on Admission                    # Obesity: Estimated body mass index is 33.07 kg/m  as calculated from the following:    Height as of 3/30/22: 1.702 m (5' 7\").    Weight as of 3/30/22: 95.8 kg (211 lb 2 oz).       "

## 2022-04-07 NOTE — ANESTHESIA POSTPROCEDURE EVALUATION
Patient: Neville Magallanes    Procedure: Procedure(s):  RECONSTRUCTION, RIGHT KNEE, ANTERIOR CRUCIATE LIGAMENT, ARTHROSCOPIC WITH HAMSTRING AUTOGRAFT       Anesthesia Type:  General, Peripheral Nerve Block    Note:  Disposition: Outpatient   Postop Pain Control: Uneventful            Sign Out: Well controlled pain   PONV: No   Neuro/Psych: Uneventful            Sign Out: Acceptable/Baseline neuro status   Airway/Respiratory: Uneventful            Sign Out: Acceptable/Baseline resp. status   CV/Hemodynamics: Uneventful            Sign Out: Acceptable CV status; No obvious hypovolemia; No obvious fluid overload   Other NRE: NONE   DID A NON-ROUTINE EVENT OCCUR? No           Last vitals:  Vitals Value Taken Time   /80 04/07/22 1005   Temp 97.9  F (36.6  C) 04/07/22 0941   Pulse 66 04/07/22 1005   Resp 16 04/07/22 1005   SpO2 96 % 04/07/22 1005       Electronically Signed By: Jr Zamudio MD  April 7, 2022  10:25 AM

## 2022-04-07 NOTE — ANESTHESIA PREPROCEDURE EVALUATION
Anesthesia Pre-Procedure Evaluation    Patient: Neville Magallanes   MRN: [id number] : 1975        Procedure : Procedure(s):  RECONSTRUCTION, RIGHT KNEE, ANTERIOR CRUCIATE LIGAMENT, ARTHROSCOPIC WITH HAMSTRING AUTOGRAFT, POSSIBLE MENISCUS REPAIR VERSUS DEBRIDEMENT, POSSIBLE CHONDRAL DEBRIDEMENT          Past Medical History:   Diagnosis Date     Uncontrolled hypertension 2021      Past Surgical History:   Procedure Laterality Date     ARTHROSCOPIC RECONSTRUCTION ANTERIOR CRUCIATE LIGAMENT Left 2016    Procedure: ARTHROSCOPIC RECONSTRUCTION ANTERIOR CRUCIATE LIGAMENT;  Surgeon: Casey Egan MD;  Location: [facility] OR      No Known Allergies   Social History     Tobacco Use     Smoking status: Former Smoker     Packs/day: 0.00     Years: 0.00     Pack years: 0.00     Smokeless tobacco: Never Used   Substance Use Topics     Alcohol use: Yes     Alcohol/week: 0.0 standard drinks     Comment: social      Wt Readings from Last 1 Encounters:   22 95.8 kg (211 lb 2 oz)        Anesthesia Evaluation   Pt has had prior anesthetic. Type: General.        ROS/MED HX  ENT/Pulmonary:  - neg pulmonary ROS     Neurologic:  - neg neurologic ROS     Cardiovascular:     (+) Dyslipidemia hypertension-----    METS/Exercise Tolerance:     Hematologic:  - neg hematologic  ROS     Musculoskeletal:  - neg musculoskeletal ROS     GI/Hepatic:  - neg GI/hepatic ROS     Renal/Genitourinary:  - neg Renal ROS     Endo: Comment: Anabolic steroid use in the past.  Currently on testosterone maintenance      Psychiatric/Substance Use:  - neg psychiatric ROS     Infectious Disease:  - neg infectious disease ROS     Malignancy:  - neg malignancy ROS     Other:  - neg other ROS          Physical Exam    Airway  airway exam normal           Respiratory Devices and Support         Dental  no notable dental history         Cardiovascular   cardiovascular exam normal          Pulmonary   pulmonary exam normal                OUTSIDE LABS:  CBC:    Lab Results   Component Value Date    WBC 8.3 03/30/2022    HGB 19.3 (H) 03/30/2022    HCT 53.4 (H) 03/30/2022     03/30/2022     BMP:   Lab Results   Component Value Date     03/30/2022     05/04/2021    POTASSIUM 4.4 03/30/2022    POTASSIUM 4.1 05/04/2021    CHLORIDE 103 03/30/2022    CHLORIDE 98 05/04/2021    CO2 29 03/30/2022    CO2 29 05/04/2021    BUN 19 03/30/2022    BUN 22 05/04/2021    CR 1.37 (H) 03/30/2022    CR 1.27 (H) 05/04/2021     (H) 03/30/2022    GLC 83 05/04/2021     COAGS: No results found for: PTT, INR, FIBR  POC: No results found for: BGM, HCG, HCGS  HEPATIC:   Lab Results   Component Value Date    ALBUMIN 4.1 12/07/2016    PROTTOTAL 7.4 12/07/2016    ALT 50 12/07/2016    AST 31 12/07/2016    ALKPHOS 69 12/07/2016    BILITOTAL 0.5 12/07/2016     OTHER:   Lab Results   Component Value Date    ILIA 9.5 03/30/2022       Anesthesia Plan    ASA Status:  2   NPO Status:  NPO Appropriate    Anesthesia Type: General.     - Airway: LMA   Induction: Intravenous, Propofol.   Maintenance: Balanced.        Consents    Anesthesia Plan(s) and associated risks, benefits, and realistic alternatives discussed. Questions answered and patient/representative(s) expressed understanding.    - Discussed:     - Discussed with:  Patient, Parent (Mother and/or Father)      - Extended Intubation/Ventilatory Support Discussed: No.      - Patient is DNR/DNI Status: No    Use of blood products discussed: No .     Postoperative Care    Pain management: IV analgesics, Oral pain medications, Peripheral nerve block (Single Shot).   PONV prophylaxis: Ondansetron (or other 5HT-3), Background Propofol Infusion     Comments:                Jr Zamudio MD

## 2022-04-07 NOTE — INTERVAL H&P NOTE
"I have reviewed the surgical (or preoperative) H&P that is linked to this encounter, and examined the patient. There are no significant changes    Clinical Conditions Present on Arrival:  Clinically Significant Risk Factors Present on Admission                  # Obesity: Estimated body mass index is 33.07 kg/m  as calculated from the following:    Height as of 3/30/22: 1.702 m (5' 7\").    Weight as of 3/30/22: 95.8 kg (211 lb 2 oz).     The History and Physical on patient's chart was personally reviewed today with the patient. there have been no interval changes in patient's history since H+P performed.    History:  Neville Magallanes is a 46 year old male with  ongoing right knee pain with a possible injury. 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 We discussed surgery and planned surgical reconstruction at that time, but never went through with it. He returns today wanting to proceed with surgery now. Currently no pain bu the knee feels unstable. Had an episode about 4 months ago while dancing, the knee \"shifted\", with pain for a few days afterwards. Instability episodes are rare as not doing much from a martial arts side of things. When has pain, in the \"center\" of the knee behind the kneecap. He's currently not working so is a good time for him now to have surgery and recover.     Back in November 2018 he was doing some martial arts training with another person, whom had their legs wrapped around him. He states he felt a \"pop\" in his knee, not much pain, slight discomfort. He didn't notice any swelling. Then a couple of months later he was at a [employer] park and felt his right knee \"give out\" causing him to go down. Onset of pain, swelling.        History of prior left knee anterior cruciate ligament reconstruction with hamstrings autograft 2016 and has done well.    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     Discussed with patient his injury of anterior cruciate ligament disruption from several years ago, with intermittent instability.      The anterior cruciate ligament provides stability of the knee by limiting anterior tibial translation. This is a common knee injury. Various treatment options exist, and an informed autonomous decision is made with respect to how to proceed with treatment. Consideration is made based on age, activity level, occupation and physical demands, as well as symptoms.     * treatment options include nonoperative with Physical Therapy working on range of motion and strengthening, activity modification, and the use of an anterior cruciate ligament stabilizing brace. Surgical options include anterior cruciate ligament reconstruction with either autograft or allograft. Options with autograft include hamstring and bone-patella tendon-bone. Benefits and disadvantages or each were discussed. Long term studies and meta-analyses do not show a clear benefit with either technique, as both seem to provide good results. Additionally, allograft options were discussed, advantages and disadvantages, including risk of disease transmission.  * Perioperative and post-operative recovery and rehabilitation was discussed.  * risks of any surgery, include: bleeding, infection, pain, scar, damage to adjacent structures such as nerves, vessels and cartilage, temporary versus permanent nerve damage, implant failure, graft failure, recurrent instability, knee stiffness and post-traumatic arthritis, need for further surgery, blood clots, pulmonary embolus, risks of anesthesia and death.  * Understanding the options of treatment, as well as the risks and benefits of each, the patient would like to proceed with right knee anterior cruciate ligament reconstruction.  * will plan for: right knee anterior cruciate ligament reconstruction with hamstring autograft, possible  meniscus debridement versus repair, possible chondral debridement, outpatient surgery    Patient elects to proceed with planned procedure. Right knee anterior cruciate ligament reconstruction. Outpatient.    Risks and perceived benefits of surgery again discussed with patient. Patient's questions addressed and answered. Written informed consent obtained and reviewed. Surgical site marked with indelible marker with patient's participation after confirming site with patient.      Casey Egan M.D., M.S.  Dept. of Orthopaedic Surgery  [facility]

## 2022-04-07 NOTE — OP NOTE
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    PROCEDURES PERFORMED:     1.  Right knee arthroscopic anterior cruciate ligament reconstruction with hamstring autograft.  2.  Right knee arthroscopic medial plica resection.    SURGEON:  Casey Egan MD    ASSISTANT:  Alex Read PA-C.  Mr. Read's assistance was necessary given technical complexity of the case with regards to graft harvest, graft preparation at the back table, tunnel placement, graft passage and fixation, and wound closure.    ANESTHESIA:  General in the supine position.    INTRAVENOUS FLUIDS:  800ml LR    ESTIMATED BLOOD LOSS:  25 mL    ANTIBIOTICS:  Cefazolin 2 gram IV prior to incision and tourniquet inflation and repeat dosage at wound closure.    DRAINS:  None.    SPECIMENS:  None.    IMPLANTS:  [employer] UltraButton for femoral fixation and Biosure tibial sheath and PEEK screw for tibial fixation.    COMPLICATIONS:  None apparent.    FINDINGS:  Mild suprapatellar synovitis.  No loose bodies in the medial and lateral gutters.  Small medial plica.  Intact patellofemoral articular surfaces.  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  Lateral compartment with intact lateral meniscus with some superficial scuffing of the posterior horn without tear.  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  Intact lateral femoral articular surface.  Medial compartment with intact medial meniscus.  Grade 1 medial chondrosis.    INDICATIONS FOR SURGERY:  Neville Magallanes is a 46-year-old gentleman with ongoing right knee pain since an injury back in, he thinks, 2018, doing some martial arts with another person who had their legs wrapped around him.  He felt a pop in his knee at that time, but not much pain, just slight discomfort.  No obvious swelling at that time.  A couple months later, [place]  [place] and felt his right knee gave out causing him to go down.  He had onset of pain and swelling.  We saw him in 02/2019, and noted a chronic ACL tear.  We discussed surgery and planned surgical reconstruction at that time, but he never followed through.  We then saw him earlier this year, wanting to proceed with surgery.  He has had some instability episodes with such activities like dancing and so forth.  We discussed again risks and perceived benefits of surgical versus nonsurgical, as well as different graft options for ACL reconstruction as well as possible meniscus treatment if there would be a tear with debridement versus repair.  Understanding the risks, already had gone through a successful left knee ACL reconstruction, he elected to proceed with his right knee.    DESCRIPTION OF PROCEDURE:  The patient was identified in the preoperative holding area.  After confirming with the patient, correct procedure and procedure site, the right knee was marked with a marking pen by myself.  After again reviewing risks and perceived benefits of surgery, questions were addressed, he elected to proceed.  Written informed consent was obtained and reviewed.    The patient was then taken to the operating room and placed supine on the operating table.  All bony prominences were well padded.  He was adequately secured.    The right lower extremity was then evaluated.  Full range of motion with actually 5 degrees of hyperextension to 140 degrees of flexion.  Stable to varus and valgus stress.  Grade 3B Lachman examination, positive pivot shift, negative dial test.    Nonsterile tourniquet was placed in the right upper thigh.  Right lower extremity was prepped and draped in the usual sterile fashion.    Timeout was then performed confirming correct patient, procedure, procedure site, availability of instruments and implants, review of the patient's allergies, as well as administration of prophylactic  antibiotics by operating staff.    At that time, right lower extremity was elevated, exsanguinated with an Esmarch and tourniquet inflated.  Approximately 3 cm incision was made over the pes anserine insertion over the proximal medial tibia.  Sharp to skin and bluntly dissecting through subcutaneous tissues down to the sartorius fascia.  Hemostasis achieved with electrocautery.  The sartorius fascia was incised using the cautery in inverted L fashion, exposing the underlying hamstring tendons underneath.  These were then dissected off the undersurface of the sartorius with Metzenbaum scissors.  Allis retractors were placed on each end of the tendons, and using my finger, I bluntly dissected these off proximally.  Small bands were sharply resected.  I then proceeded to whipstitch each end of these tendons with a #2 Ultrabraid suture.  Now having good excursion on these tendons, I was able to probe more proximally and dissect out any deeper bands and follow the tendon to the muscle for each one of these tendons.  At that time, I used the graft harvester and harvested each one of these.  They were nice grafts.    At that time, Mr. Read prepared these at the back table by removing any remnant muscle from the tendons, cleaning these up from any soft tissue and whipstitching each of the ends doubling the grafts over and sizing this to a 9 mm sizer.  These were then placed on tension and wrapped with saline gauze.    During that time, I proceeded with the arthroscopic portion of the case.  Anterolateral arthroscopy portal was made.  Upon entering the suprapatellar pouch, no obvious effusion.  Mild synovitis.  No loose bodies in the medial and lateral gutters.  Minimal patellofemoral chondrosis.  Upon entering the notch, there was a small remnant ACL stump, but obvious bare wall within the notch, indicating chronicity of the tear.  Anteromedial arthroscopy portal was made, guided with a spinal needle.  Probe was introduced.   The knee was then placed in a figure-four position evaluating the lateral compartment.  Probing superior and inferior surface, the lateral meniscus revealed no obvious tear.  There was some superficial scuffing of the posterior horn without definite tearing with careful probing.  Mild medial tibial chondrosis.    At that time back up to suprapatellar pouch and medial plica resected.  Then to the medial compartment.  Probing superior and inferior surface of the medial meniscus revealed no obvious tears.  Minimal medial chondrosis.    At that time, I then proceeded to debride the remnant ACL stump off of the tibial footprint.  Soft tissue and adhesions along the wall of the notch were removed with the ablator as well as the oscillating debrider.  Very residual remnants of the ACL footprint off of the femur.  I then proceeded to place the femoral guide through the anteromedial portal in the approximate area of the femoral ACL footprint.  This was then passed and appeared to be in good position confirmed with C-arm.  I then proceeded to over ream this with a 9 mm reamer to a depth of 20 mm.  I then proceeded to ream the cortex with the 4.5 EndoButton reamer.  Soft tissue and bony debris was removed.  Visual inspection of the tunnel revealed a good lateral cortex and posterior wall.  A passing suture was then placed through the guide pin and passed through the anteromedial portal out through the femoral tunnel.    I then proceeded to place the tibial tunnel using the guide set at 55 degrees.  This was placed just medial off midline in front of the medial spine in line with the junction of the anterior horn of the lateral meniscus.  Guide pin was then placed.  The knee was taken into extension.  This appeared to be adequate position without anterior impingement.  This was then over reamed with a 9 mm reamer.  Bony debris was then removed from the joint as well as the tibial tunnel.  The passing suture was then passed  from inside the joint into the tibial tunnel.  The ACL graft was then passed using the passing suture through the tibial tunnel, through the notch, into the femoral tunnel until the UltraButton passed the lateral femoral cortex and flipped.  This was confirmed with C-arm.  Good tension was applied to this.  The graft was then passed and snugged up firmly into the femoral tunnel.  Images were obtained.  The knee was then cycled in flexion and extension for 20 times.  The knee was then placed into approximately 15 degrees of flexion, and with tension, the tibial portion of the graft was fixated using the sheath and screw.  Range of motion was full.  There was no anterior impingement.  Essentially grade 0 Lachman.  At that time, the tourniquet was deflated.  Wounds were irrigated.  The graft harvest site was closed in layered fashion with 0 Vicryl for the sartorius fascia and deep tissues, followed by 3-0 Monocryl and 3-0 running subcuticular Prolene.  The portal sites were closed with 3-0 Prolene, Steri-Strips.  Each of the wounds were injected with 0.25% Marcaine, followed by a well-padded soft dressing, compression wrap and a knee immobilizer.    The patient was then awakened and taken to recovery in stable condition.  Postoperatively, rest, ice, elevate.  He may weightbear with a knee immobilizer.  Oral pain medications include oxycodone, acetaminophen, and methocarbamol.  Stool softeners.  Daily aspirin 4 weeks for blood thinning.  We will see him back in 2 weeks' time for wound check, suture removal, sooner if needed.  Physical therapy will follow hamstring reconstruction protocol.    No apparent complications.    Casey Egan MD        D: 2022   T: 2022   MT: ALISA    Name:     MAYO KNIGHT  MRN:      [id number]        Account:        [id number]   :      1975           Procedure Date: 2022     Document: [id number]

## 2022-04-07 NOTE — ANESTHESIA PROCEDURE NOTES
Adductor canal Procedure Note    Pre-Procedure   Staff -        Anesthesiologist:  Jr Zamudio MD       Performed By: anesthesiologist       Location: pre-op       Procedure Start/Stop Times: 4/7/2022 6:40 AM and 4/7/2022 6:55 AM       Pre-Anesthestic Checklist: patient identified, IV checked, site marked, risks and benefits discussed, informed consent, monitors and equipment checked, pre-op evaluation, at physician/surgeon's request and post-op pain management  Timeout:       Correct Patient: Yes        Correct Procedure: Yes        Correct Site: Yes        Correct Position: Yes        Correct Laterality: Yes        Site Marked: Yes  Procedure Documentation  Procedure: Adductor canal       Laterality: right       Patient Position: supine       Patient Prep/Sterile Barriers: sterile gloves, mask, patient draped       Skin prep: DuraPrep and Chloraprep       Local skin infiltrated with 2 mL of 1% lidocaine.        Needle Gauge: 22.        Needle Length (millimeters): 100        Ultrasound guided       1. Ultrasound was used to identify targeted nerve, plexus, vascular marker, or fascial plane and place a needle adjacent to it in real-time.       2. Ultrasound was used to visualize the spread of anesthetic in close proximity to the above referenced structure.       3. A permanent image is entered into the patient's record.       4. The visualized anatomic structures appeared normal.       5. There were no apparent abnormal pathologic findings.    Assessment/Narrative         The placement was negative for: blood aspirated and painful injection       Paresthesias: No.     Bolus given via needle..        Secured via.        Insertion/Infusion Method: Single Shot       Injection made incrementally with aspirations every 3 mL.    Medication(s) Administered   Bupivacaine 0.25% PF (Infiltration), 20 mL  Bupivacaine liposome (Exparel) 1.3% LA inj susp (Infiltration), 10 mL  Medication Administration Time: 4/7/2022 6:55  AM     Comments:  20 mL of 0.25 percent Bupivacaine plus 10 mL of Exparel injected incrementally into adductor canal

## 2022-04-08 ENCOUNTER — TELEPHONE (OUTPATIENT)
Dept: SURGERY | Facility: CLINIC | Age: 47
End: 2022-04-08
Payer: COMMERCIAL

## 2022-04-08 DIAGNOSIS — Z98.890 S/P ACL RECONSTRUCTION: Primary | ICD-10-CM

## 2022-04-08 DIAGNOSIS — S83.511D RUPTURE OF ANTERIOR CRUCIATE LIGAMENT OF RIGHT KNEE, SUBSEQUENT ENCOUNTER: ICD-10-CM

## 2022-04-08 NOTE — TELEPHONE ENCOUNTER
[facility] Call Center    Phone Message    May a detailed message be left on voicemail: yes     Reason for Call: Medication Refill Request    Has the patient contacted the pharmacy for the refill? Yes   Name of medication being requested: oxycodone 5mg   Provider who prescribed the medication: Dr Egan  Pharmacy: [id number] - [place], [place] - [address]  Date medication is needed: 04/08/2022     Pt said someone would want to talk to him regarding this refill    Action Taken: Other: ortho    Travel Screening: Not Applicable

## 2022-04-08 NOTE — TELEPHONE ENCOUNTER
See other mychart encounter.    Patient will see provider in May for these issues.    Gosia Howe RN  [facility]

## 2022-04-08 NOTE — TELEPHONE ENCOUNTER
"Noted.   I see plan at 3/30 pre-op:        Instructions         Return in about 1 month (around 4/30/2022) for Follow up, Routine preventive.        I see testosterone level is \"in process\".    Plan to wait for that result before calling patient again.    Gosia Howe RN  [facility]      "
I do not feel comfortable sending a prescription for Testerone because of the the following reasons,    1. Labs are not uptodate.  2. His lipid and Testerone levels that were checked in the past have been elevated  3. hematocrit and hemoglobin that we just checked are elevated.  4. His testosterone regimen needs to be adjusted.    Drea Husain MD,[facility]  
Left message on voice mail for patient to call clinic.   [phone]      Yessenia Wetzel RN BSN  [facility]    
Patient notified of below, has scheduled an appt with Dr. Lubin and voiced understanding and agreement.  He will schedule lab appt later.  He will call Dr. Egan's team to get refill of his Oxycodone, he had knee surgery yesterday.  Prudence Garcia RN  [facility]      
Routed in error to wrong Dr. Lubin.    Re-routed now.    Gosia Howe RN  [facility]      
Routing refill request to provider for review/approval because:  Labs not current:  Ast,alt hct      Requested Prescriptions   Pending Prescriptions Disp Refills     testosterone cypionate (DEPOTESTOSTERONE) 100 MG/ML injection 3 mL 0     Sig: INJECT 0.75 MLS (75 MG) INTO THE MUSCLE ONCE A WEEK       Androgen Agents Failed - 4/1/2022  8:58 PM        Failed - ALT on file within past 12 mos     Recent Labs   Lab Test 12/07/16  0938   ALT 50             Failed - HCT less than 54% on file within past 12 mos     Recent Labs   Lab Test 03/30/22  1619   HCT 53.4*             Failed - Refills for this classification require provider review        Failed - AST on file within past 12 mos     Recent Labs   Lab Test 12/07/16  0938   AST 31             Passed - Patient is of age 12 and older        Passed - Lipid panel on file in past 12 mos     Recent Labs   Lab Test 05/04/21  1352   CHOL 212*   TRIG 177*   HDL 33*   *   NHDL 179*               Passed - Medication is active on med list        Passed - Serum Testosterone on file within past 12 mos     Recent Labs   Lab Test 05/04/21  1352   TESTOSTTOTAL 1,573*             Passed - Serum PSA on file within past 12 mos     Lab Results   Component Value Date    PSA 1.06 05/04/2021             Passed - Blood pressure under 140/90 in past 6 months     BP Readings from Last 3 Encounters:   03/30/22 128/89   02/07/22 135/84   05/04/21 (!) 147/99                 Passed - Patient is not pregnant        Passed - No positive pregnancy test on file within past 12 mos        Passed - Recent (6 mo) or future (30 days) visit within the authorizing provider's specialty             
See patient's mychart message, he is questioning the cholesterol test being done as he was only expecting to do the covid and testosterone test.    Testosterone still in progress.    Await result, I see encounter was also routed back to PCP by other RN [facility]       He is still wanting to have his testosterone refilled.    Gosia Howe RN  [facility]      
See result note in Epic, patient says Dr. Lubin is now his PCP.    Routed to new PCP to address as patient was seen recently for pre-op.    Requesting to stay on testosterone despite abnormal lab.     Hematocrit   Date Value Ref Range Status   03/30/2022 53.4 (H) 40.0 - 53.0 % Final        Gosia Howe RN  [facility]       
Testosterone is a controlled medication that requires some type of follow up visit every 3 months. Needs visit (mychart/ virtual/ in person). PABLO Holliday, FNP-BC    
Testosterone still in process, this can take 3-5 days for results.  Prudence Garcia RN  [facility]    
The lipid panel was not ordered by me and I was not aware when it was ordered hence the reason I did not inform you about it .    I understand the lipid was not done fasting, this would only affect your triglyceride and total cholesterol. It would not affect your LDL which is elevated at 200 and your cholesterol numbers have been high in the past.     We still don't have an ALT and AST  on file in the last one year. I also need these to be done      As I have mentioned before , I do not feel comfortable refilling your testosterone with all these abnormal labs.    We have a few options  1. Schedule an appointment either virtual or in person to discuss about changing your testosterone regimen  2. Refer you to an endocrinologist for a second opinion.    Kindly let me know if you have any questions.    Drea Husain MD,[facility]  
19:05

## 2022-04-11 RX ORDER — OXYCODONE HYDROCHLORIDE 5 MG/1
5-10 TABLET ORAL EVERY 6 HOURS PRN
Qty: 16 TABLET | Refills: 0 | Status: SHIPPED | OUTPATIENT
Start: 2022-04-11 | End: 2022-04-14

## 2022-04-11 NOTE — TELEPHONE ENCOUNTER
Prescription has been escribed.    Casey Egan M.D., M.S.  Dept. of Orthopaedic Surgery  [facility]

## 2022-04-11 NOTE — TELEPHONE ENCOUNTER
[facility] Call Center    Phone Message    May a detailed message be left on voicemail: yes     Reason for Call: Other: please call back to clarify     Action Taken: Message routed to:  [facility] ortho    Travel Screening: Not Applicable     Patient wanted this script sent to [facility] [place] -- please er send and call back once this is done                                                                          no

## 2022-04-11 NOTE — TELEPHONE ENCOUNTER
Called patient and let him know his script was sent. He thanked me for the update.  Frances Vogel Certified Medical Assistant

## 2022-04-14 ENCOUNTER — TELEPHONE (OUTPATIENT)
Dept: SURGERY | Facility: CLINIC | Age: 47
End: 2022-04-14
Payer: COMMERCIAL

## 2022-04-14 DIAGNOSIS — S83.511D RUPTURE OF ANTERIOR CRUCIATE LIGAMENT OF RIGHT KNEE, SUBSEQUENT ENCOUNTER: ICD-10-CM

## 2022-04-14 DIAGNOSIS — Z98.890 S/P ACL RECONSTRUCTION: Primary | ICD-10-CM

## 2022-04-14 RX ORDER — OXYCODONE HYDROCHLORIDE 5 MG/1
5-10 TABLET ORAL EVERY 6 HOURS PRN
Qty: 16 TABLET | Refills: 0 | Status: SHIPPED | OUTPATIENT
Start: 2022-04-14 | End: 2022-04-21

## 2022-04-14 NOTE — TELEPHONE ENCOUNTER
This has been escribed to [facility] on [address] in [place].    Casey Egan M.D., M.S.  Dept. of Orthopaedic Surgery  [facility]

## 2022-04-14 NOTE — TELEPHONE ENCOUNTER
[facility] Call [facility]    Phone Message    May a detailed message be left on voicemail: yes     Reason for Call: Medication Refill Request    Has the patient contacted the pharmacy for the refill? Yes   Name of medication being requested: Oxycodone  Provider who prescribed the medication: Jignesh  Pharmacy: [facility] in [place] [address]  Date medication is needed: today      Action Taken: Message routed to:  [facility] be orhto    Travel Screening: Not Applicable     Please c/b once refilled

## 2022-04-18 NOTE — PROGRESS NOTES
Chief Complaint   Patient presents with     Right Knee - Surgical Followup     ACL reconstruction with HS autograft. DOS 4/7/22, 2 wk s/p. Patient notes his knee is doing good. Recovery is going much better than his left one. He wears the immobilizer at night and most of the time throughout the day but does take it off and walks so with his leg straight. He is taking about 2 pain meds per day. He has trouble sleeping through the night without pain.          SURGERY: right knee anterior cruciate ligament reconstruction with hamstring autograft.  DATE OF SURGERY: 4/7/2022.      HISTORY OF PRESENT ILLNESS:  Neville Magallanes is a 46 year old male seen for postoperative evaluation of a right knee arthroscopy and anterior cruciate ligament hamstring reconstruction, for chronic anterior cruciate ligament deficiency. Surgery occurred 2 weeks ago. Returns today stating overall doing well. Pain has been improving. Wearing his immobilizer most times at night but does take it off and walk with his leg straight at home without it. . No problems with the surgical wounds. Denies fevers chills or night sweats. No associated numbness or tingling. Has been doing home exercise program  since surgery as recommended. Taking aspirin daily. Takes about 2 pain pills per day, difficulties sleeping through the night without pain medications.    Pain 3/10.    OR FINDINGS:  Mild suprapatellar synovitis.  No loose bodies in the medial and lateral gutters.  Small medial plica.  Intact patellofemoral articular surfaces.  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  Lateral compartment with intact lateral meniscus with some superficial scuffing of the posterior horn without tear.  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  Intact lateral femoral articular surface.  Medial compartment with intact medial meniscus.  Grade 1 medial chondrosis.    Past Medical History:   Diagnosis Date     Uncontrolled hypertension 5/4/2021  "      Past Surgical History:   Procedure Laterality Date     ARTHROSCOPIC RECONSTRUCTION ANTERIOR CRUCIATE LIGAMENT Left 5/5/2016    Procedure: ARTHROSCOPIC RECONSTRUCTION ANTERIOR CRUCIATE LIGAMENT;  Surgeon: Casey Egan MD;  Location: [facility] OR       Medications:   Current Outpatient Medications:      amLODIPine (NORVASC) 5 MG tablet, Take 1 tablet (5 mg) by mouth daily, Disp: 30 tablet, Rfl: 4     lisinopril (ZESTRIL) 10 MG tablet, Take 1 tablet (10 mg) by mouth daily, Disp: 90 tablet, Rfl: 3     methocarbamol (ROBAXIN) 500 MG tablet, Take 2 tablets (1,000 mg) by mouth 3 times daily as needed for muscle spasms or other (pain.), Disp: 60 tablet, Rfl: 1     oxyCODONE (ROXICODONE) 5 MG tablet, Take 1-2 tablets (5-10 mg) by mouth every 6 hours as needed for moderate to severe pain, Disp: 16 tablet, Rfl: 0     senna-docusate (SENOKOT-S/PERICOLACE) 8.6-50 MG tablet, Take 1-2 tablets by mouth 2 times daily, Disp: 30 tablet, Rfl: 0     testosterone cypionate (DEPOTESTOSTERONE) 100 MG/ML injection, INJECT 0.75 MLS (75 MG) INTO THE MUSCLE ONCE A WEEK, Disp: 3 mL, Rfl: 0    Allergies: No Known Allergies    REVIEW OF SYSTEMS:   CONSTITUTIONAL:NEGATIVE for fever, chills, night sweats  INTEGUMENTARY/SKIN: NEGATIVE for worrisome wound problems or redness.  MUSCULOSKELETAL:See HPI above  NEURO: NEGATIVE for weakness, dizziness or paresthesias    PHYSICAL EXAM:  BP (!) 151/100   Pulse 94   Ht 1.702 m (5' 7\")   SpO2 97%   BMI 33.07 kg/m     GENERAL APPEARANCE: healthy, alert, no distress  SKIN: no suspicious lesions or rashes  NEURO: Normal strength and tone, mentation intact and speech normal  PSYCH:  mentation appears normal and affect normal/bright, not anxious  RESPIRATORY: No increased work of breathing.    right  LOWER EXTREMITY:  Gait: favors the right in immobilizer.  mild Quad atrophy, strength weak  Intact sensation deep peroneal nerve, superficial peroneal nerve, med/lat tibial nerve, sural nerve, saphenous " nerve  Intact EHL, EDL, TA, FHL, GS, quadriceps hamstrings and hip flexors  Toes warm and well perfused, brisk capillary refill. Palpable 2+ dp pulses.  calf soft and nttp or squeeze.  Edema: trace      right  KNEE EXAM:    Skin: intact, no ecchymosis or erythema  ROM: full extension to 120 flexion  Effusion: moderate   Tender: diffuse.          X-RAY: none today..        Impression: Neville Magallanes is a 46 year old male 2 weeks status post right knee arthroscopy and anterior cruciate ligament reconstruction with hamstring autograft, doing well.     Plan: routine postoperative knee anterior cruciate ligament hamstring autograft reconstruction    * WB status: as tolerated in brace/immobilizer. Immobilizer day and night x2 more weeks, then brace. Must be in immobilizer for ambulation.  * Rest  * Activity modification - avoid activities that aggravate symptoms.  * NSAIDS - regular use for inflammation, with food, as long as no contra-indications. Please discuss with pcp if needed.  * Ice twice daily to three times daily.  * Compression wrap  * Elevation of extremity to reduce swelling  * PT ordered for strengthening, stretching and range of motion exercises, effusion control; continue postoperative anterior cruciate ligament hamstring protocol  * wean to Tylenol as needed for pain; oxycodone 5mg, #8, no refills, escribed today.  * ASA total 2 weeks postop  * return to clinic 4 weeks, sooner as needed. 2 view xray right knee.          Casey Egan M.D., M.S.  Dept. of Orthopaedic Surgery  [facility]

## 2022-04-21 ENCOUNTER — OFFICE VISIT (OUTPATIENT)
Dept: ORTHOPEDICS | Facility: CLINIC | Age: 47
End: 2022-04-21
Payer: COMMERCIAL

## 2022-04-21 VITALS
HEART RATE: 94 BPM | SYSTOLIC BLOOD PRESSURE: 151 MMHG | HEIGHT: 67 IN | DIASTOLIC BLOOD PRESSURE: 100 MMHG | BODY MASS INDEX: 33.07 KG/M2 | OXYGEN SATURATION: 97 %

## 2022-04-21 DIAGNOSIS — S83.511D RUPTURE OF ANTERIOR CRUCIATE LIGAMENT OF RIGHT KNEE, SUBSEQUENT ENCOUNTER: ICD-10-CM

## 2022-04-21 DIAGNOSIS — Z98.890 S/P ACL RECONSTRUCTION: Primary | ICD-10-CM

## 2022-04-21 PROCEDURE — 99024 POSTOP FOLLOW-UP VISIT: CPT | Performed by: ORTHOPAEDIC SURGERY

## 2022-04-21 RX ORDER — OXYCODONE HYDROCHLORIDE 5 MG/1
5-10 TABLET ORAL EVERY 8 HOURS PRN
Qty: 8 TABLET | Refills: 0 | Status: SHIPPED | OUTPATIENT
Start: 2022-04-21 | End: 2022-04-21

## 2022-04-21 RX ORDER — OXYCODONE HYDROCHLORIDE 5 MG/1
5-10 TABLET ORAL EVERY 8 HOURS PRN
Qty: 8 TABLET | Refills: 0 | Status: SHIPPED | OUTPATIENT
Start: 2022-04-21 | End: 2022-08-11

## 2022-04-21 ASSESSMENT — PAIN SCALES - GENERAL: PAINLEVEL: MILD PAIN (3)

## 2022-04-21 NOTE — NURSING NOTE
Patient was fitted with an extra large velocity hinged knee brace. All questions were answered to patient's satisfaction. DME form explained, signed, and copy given to the patient for their records.   Franecs Vogel Clinical Medical Assistant

## 2022-04-21 NOTE — LETTER
4/21/2022         RE: Neville Magallanes  [address] [place] [address]        Dear Colleague,    Thank you for referring your patient, Neville Magallanes, to the [facility]. Please see a copy of my visit note below.    Chief Complaint   Patient presents with     Right Knee - Surgical Followup     ACL reconstruction with HS autograft. DOS 4/7/22, 2 wk s/p. Patient notes his knee is doing good. Recovery is going much better than his left one. He wears the immobilizer at night and most of the time throughout the day but does take it off and walks so with his leg straight. He is taking about 2 pain meds per day. He has trouble sleeping through the night without pain.          SURGERY: right knee anterior cruciate ligament reconstruction with hamstring autograft.  DATE OF SURGERY: 4/7/2022.      HISTORY OF PRESENT ILLNESS:  Neville Magallanes is a 46 year old male seen for postoperative evaluation of a right knee arthroscopy and anterior cruciate ligament hamstring reconstruction, for chronic anterior cruciate ligament deficiency. Surgery occurred 2 weeks ago. Returns today stating overall doing well. Pain has been improving. Wearing his immobilizer most times at night but does take it off and walk with his leg straight at home without it. . No problems with the surgical wounds. Denies fevers chills or night sweats. No associated numbness or tingling. Has been doing home exercise program  since surgery as recommended. Taking aspirin daily. Takes about 2 pain pills per day, difficulties sleeping through the night without pain medications.    Pain 3/10.    OR FINDINGS:  Mild suprapatellar synovitis.  No loose bodies in the medial and lateral gutters.  Small medial plica.  Intact patellofemoral articular surfaces.  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  Lateral compartment with intact lateral meniscus with some superficial scuffing of the posterior horn without  "tear.  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  Intact lateral femoral articular surface.  Medial compartment with intact medial meniscus.  Grade 1 medial chondrosis.    Past Medical History:   Diagnosis Date     Uncontrolled hypertension 5/4/2021       Past Surgical History:   Procedure Laterality Date     ARTHROSCOPIC RECONSTRUCTION ANTERIOR CRUCIATE LIGAMENT Left 5/5/2016    Procedure: ARTHROSCOPIC RECONSTRUCTION ANTERIOR CRUCIATE LIGAMENT;  Surgeon: Casey Egan MD;  Location: [facility] OR       Medications:   Current Outpatient Medications:      amLODIPine (NORVASC) 5 MG tablet, Take 1 tablet (5 mg) by mouth daily, Disp: 30 tablet, Rfl: 4     lisinopril (ZESTRIL) 10 MG tablet, Take 1 tablet (10 mg) by mouth daily, Disp: 90 tablet, Rfl: 3     methocarbamol (ROBAXIN) 500 MG tablet, Take 2 tablets (1,000 mg) by mouth 3 times daily as needed for muscle spasms or other (pain.), Disp: 60 tablet, Rfl: 1     oxyCODONE (ROXICODONE) 5 MG tablet, Take 1-2 tablets (5-10 mg) by mouth every 6 hours as needed for moderate to severe pain, Disp: 16 tablet, Rfl: 0     senna-docusate (SENOKOT-S/PERICOLACE) 8.6-50 MG tablet, Take 1-2 tablets by mouth 2 times daily, Disp: 30 tablet, Rfl: 0     testosterone cypionate (DEPOTESTOSTERONE) 100 MG/ML injection, INJECT 0.75 MLS (75 MG) INTO THE MUSCLE ONCE A WEEK, Disp: 3 mL, Rfl: 0    Allergies: No Known Allergies    REVIEW OF SYSTEMS:   CONSTITUTIONAL:NEGATIVE for fever, chills, night sweats  INTEGUMENTARY/SKIN: NEGATIVE for worrisome wound problems or redness.  MUSCULOSKELETAL:See HPI above  NEURO: NEGATIVE for weakness, dizziness or paresthesias    PHYSICAL EXAM:  BP (!) 151/100   Pulse 94   Ht 1.702 m (5' 7\")   SpO2 97%   BMI 33.07 kg/m     GENERAL APPEARANCE: healthy, alert, no distress  SKIN: no suspicious lesions or rashes  NEURO: Normal strength and tone, mentation intact and speech normal  PSYCH:  mentation appears normal and affect normal/bright, not anxious  RESPIRATORY: " No increased work of breathing.    right  LOWER EXTREMITY:  Gait: favors the right in immobilizer.  mild Quad atrophy, strength weak  Intact sensation deep peroneal nerve, superficial peroneal nerve, med/lat tibial nerve, sural nerve, saphenous nerve  Intact EHL, EDL, TA, FHL, GS, quadriceps hamstrings and hip flexors  Toes warm and well perfused, brisk capillary refill. Palpable 2+ dp pulses.  calf soft and nttp or squeeze.  Edema: trace      right  KNEE EXAM:    Skin: intact, no ecchymosis or erythema  ROM: full extension to 120 flexion  Effusion: moderate   Tender: diffuse.          X-RAY: none today..        Impression: Neville Magallanes is a 46 year old male 2 weeks status post right knee arthroscopy and anterior cruciate ligament reconstruction with hamstring autograft, doing well.     Plan: routine postoperative knee anterior cruciate ligament hamstring autograft reconstruction    * WB status: as tolerated in brace/immobilizer. Immobilizer day and night x2 more weeks, then brace. Must be in immobilizer for ambulation.  * Rest  * Activity modification - avoid activities that aggravate symptoms.  * NSAIDS - regular use for inflammation, with food, as long as no contra-indications. Please discuss with pcp if needed.  * Ice twice daily to three times daily.  * Compression wrap  * Elevation of extremity to reduce swelling  * PT ordered for strengthening, stretching and range of motion exercises, effusion control; continue postoperative anterior cruciate ligament hamstring protocol  * wean to Tylenol as needed for pain; oxycodone 5mg, #8, no refills, escribed today.  * ASA total 2 weeks postop  * return to clinic 4 weeks, sooner as needed. 2 view xray right knee.          Casey Egan M.D., M.S.  Dept. of Orthopaedic Surgery  [facility]      Again, thank you for allowing me to participate in the care of your patient.        Sincerely,        Casey Egan MD

## 2022-04-27 ENCOUNTER — TELEPHONE (OUTPATIENT)
Dept: SURGERY | Facility: CLINIC | Age: 47
End: 2022-04-27
Payer: COMMERCIAL

## 2022-04-27 RX ORDER — OXYCODONE HYDROCHLORIDE 5 MG/1
5 TABLET ORAL EVERY 6 HOURS PRN
Qty: 6 TABLET | Refills: 0 | Status: SHIPPED | OUTPATIENT
Start: 2022-04-27 | End: 2022-04-30

## 2022-04-27 NOTE — TELEPHONE ENCOUNTER
I spoke to patient and sent a final refill of 6 tabs of oxycodone to the [facility] in [place] on [address]. He thanked me for the call.    Alex Read PA-C, CAQ-OS  Dept. of Orthopedic Surgery  [facility]

## 2022-04-27 NOTE — TELEPHONE ENCOUNTER
Patient has questions regarding oxycodone. Would like a call back today.     [phone]    Alexa

## 2022-04-27 NOTE — TELEPHONE ENCOUNTER
Patient Quality Outreach    Patient is due for the following:   Chronic pain management-Patient has had 3 or more controlled substances prescribed in the last 12 months.    Failing-urine tox screen, phq9, gad7, CSA    Type of outreach:    Routed to PCP and prescribing provider to review/address      Questions for provider review:    Kelvin Cruz  Chart routed to Care Team.

## 2022-05-03 ENCOUNTER — THERAPY VISIT (OUTPATIENT)
Dept: PHYSICAL THERAPY | Facility: CLINIC | Age: 47
End: 2022-05-03
Attending: ORTHOPAEDIC SURGERY
Payer: COMMERCIAL

## 2022-05-03 DIAGNOSIS — Z98.890 S/P ACL RECONSTRUCTION: Primary | ICD-10-CM

## 2022-05-03 DIAGNOSIS — R60.0 LOCALIZED EDEMA: ICD-10-CM

## 2022-05-03 DIAGNOSIS — M25.561 RIGHT KNEE PAIN: ICD-10-CM

## 2022-05-03 PROCEDURE — 97161 PT EVAL LOW COMPLEX 20 MIN: CPT | Mod: GP | Performed by: PHYSICAL THERAPIST

## 2022-05-03 PROCEDURE — 97110 THERAPEUTIC EXERCISES: CPT | Mod: GP | Performed by: PHYSICAL THERAPIST

## 2022-05-03 PROCEDURE — 97016 VASOPNEUMATIC DEVICE THERAPY: CPT | Mod: GP | Performed by: PHYSICAL THERAPIST

## 2022-05-03 ASSESSMENT — ACTIVITIES OF DAILY LIVING (ADL)
SQUAT: ACTIVITY IS VERY DIFFICULT
HOW_WOULD_YOU_RATE_THE_OVERALL_FUNCTION_OF_YOUR_KNEE_DURING_YOUR_USUAL_DAILY_ACTIVITIES?: ABNORMAL
KNEEL ON THE FRONT OF YOUR KNEE: I AM UNABLE TO DO THE ACTIVITY
GIVING WAY, BUCKLING OR SHIFTING OF KNEE: I DO NOT HAVE THE SYMPTOM
RISE FROM A CHAIR: ACTIVITY IS MINIMALLY DIFFICULT
HOW_WOULD_YOU_RATE_THE_CURRENT_FUNCTION_OF_YOUR_KNEE_DURING_YOUR_USUAL_DAILY_ACTIVITIES_ON_A_SCALE_FROM_0_TO_100_WITH_100_BEING_YOUR_LEVEL_OF_KNEE_FUNCTION_PRIOR_TO_YOUR_INJURY_AND_0_BEING_THE_INABILITY_TO_PERFORM_ANY_OF_YOUR_USUAL_DAILY_ACTIVITIES?: 45
AS_A_RESULT_OF_YOUR_KNEE_INJURY,_HOW_WOULD_YOU_RATE_YOUR_CURRENT_LEVEL_OF_DAILY_ACTIVITY?: ABNORMAL
WEAKNESS: THE SYMPTOM PREVENTS ME FROM ALL DAILY ACTIVITIES
KNEE_ACTIVITY_OF_DAILY_LIVING_SUM: 35
LIMPING: THE SYMPTOM AFFECTS MY ACTIVITY MODERATELY
WALK: ACTIVITY IS SOMEWHAT DIFFICULT
SIT WITH YOUR KNEE BENT: ACTIVITY IS SOMEWHAT DIFFICULT
STAND: ACTIVITY IS MINIMALLY DIFFICULT
STIFFNESS: THE SYMPTOM AFFECTS MY ACTIVITY MODERATELY
GO UP STAIRS: ACTIVITY IS SOMEWHAT DIFFICULT
SWELLING: THE SYMPTOM AFFECTS MY ACTIVITY SLIGHTLY
GO DOWN STAIRS: ACTIVITY IS SOMEWHAT DIFFICULT
KNEE_ACTIVITY_OF_DAILY_LIVING_SCORE: 50
PAIN: THE SYMPTOM AFFECTS MY ACTIVITY MODERATELY
RAW_SCORE: 35

## 2022-05-03 NOTE — PROGRESS NOTES
Physical Therapy Initial Evaluation  Physical Therapy Initial Examination/Evaluation    May 3, 2022    Neville Magallanes  is a 46 year old  male referred to physical therapy by Dr. Egan for treatment of R ACL.      ACL reconstruction with HS autograft. DOS 4/7/22    DOI/onset 2019; DOS 4/7/2022  Mechanism of injury  I was doing some martial arts and felt a pop; 2/2019.  It was a few years ago.  Then I was on a trampoline and felt it shift.  Then when I was dancing at my brothers wedding it felt really unstable and reminded me I needed to get the surgery.  Pain was predominately behind the knee cap  Prior treatment pre-conditioning. Effect of prior treatment good    Chief Complaint:   Immobility- not bad, doing better than last time.   Pain location: R knee,   Quality: dull  Constant/Intermittent: intermittent  Time of day: with activity  Symptoms have improved since onset.    Current pain 3/10.  Pain at worst 6.5/10.    Symptoms aggravated by sleeping, pain waking in the night.    Symptoms improved with rest, ice.     Social history:  Pt is single, no kids.  Lives in a home.  Stairs in the home.  Pt enjoys martial art; [employer]- sparring, jumping and landing 4x/week.  Pt has access to a gym, does weight lifting and goes for walks.    Occupation: Manager.  Job duties:  Home and self cares.    Patient having difficulty with ADLs: stairs, walking moving.    Patient's goals are improve pain, return to normal function.    Patient reports general health as good.  Related medical history none.    Surgical History:  ACL.    Imaging: MRI.    Medications:  As needed.       Outcome measure:   KOS  Return to MD:  As needed.      Clinical Impression: Neville presents to [facility] R ACL reconstruction; HS autograft. DOS 4/7/22.  Per clinical examination, pt is progressing as expected in the early post operative stages.  He continues to wear his immobilizer for ambulatory activity.  Pt with limited quadriceps function, ROM and  pain.  Pt will benefit from skilled physical therapy to improve pain and overall function.  See plan of care outlined below.      HPI                    Objective:  Pt presents to clinic with immobilizer present today; WBAT.      System                                                Knee Evaluation:  ROM:    AROM    Hyperextension:  Left:  6    Right: 3    Flexion: Left: 132    Right: 108 deg   PROM        Flexion: Left:   Right:  110 deg      Strength:         Quad Set Left: Good    Pain:   Quad Set Right:  Good    Pain: +/-  Ligament Testing:  Not Assessed                Special Tests: Not Assessed      Palpation:  Normal (incisons intact, no drainage.  )      Edema:    Circumference:  20 cm Prox:  Left:  59 cm   Right:  57 cm     Joint Line:  Left:  37.5 cm    Right:  39.5 cm           Slight quad lag at 10 reps     General     ROS    Assessment/Plan:    Patient is a 46 year old male with right side knee complaints.    Patient has the following significant findings with corresponding treatment plan.                Diagnosis 1:  R ACLR    Pain -  hot/cold therapy, US, electric stimulation, manual therapy, self management, education and home program  Decreased ROM/flexibility - manual therapy, therapeutic exercise and home program  Decreased strength - therapeutic exercise and therapeutic activities  Impaired balance - neuro re-education and therapeutic activities  Decreased proprioception - neuro re-education and therapeutic activities  Inflammation - cold therapy, US and self management/home program  Edema - vasopneumatics, electric stimulation and self management/home program  Impaired gait - gait training  Impaired muscle performance - neuro re-education  Decreased function - therapeutic activities      Therapy Evaluation Codes:   1) History comprised of:   Personal factors that impact the plan of care:      Past/current experiences, Time since onset of symptoms and Work status.    Comorbidity factors that  impact the plan of care are:      None.     Medications impacting care: High blood pressure and hormone replacement and pain.  2) Examination of Body Systems comprised of:   Body structures and functions that impact the plan of care:      Knee.   Activity limitations that impact the plan of care are:      Bathing, Bending, Lifting, Sports, Squatting/kneeling, Stairs, Walking and Working.  3) Clinical presentation characteristics are:   Evolving/Changing.  4) Decision-Making    Low complexity using standardized patient assessment instrument and/or measureable assessment of functional outcome.  Cumulative Therapy Evaluation is: Low complexity.    Previous and current functional limitations:  (See Goal Flow Sheet for this information)    Short term and Long term goals: (See Goal Flow Sheet for this information)     Communication ability:  Patient appears to be able to clearly communicate and understand verbal and written communication and follow directions correctly.  Treatment Explanation - The following has been discussed with the patient:   RX ordered/plan of care  Anticipated outcomes  Possible risks and side effects  This patient would benefit from PT intervention to resume normal activities.   Rehab potential is good.    Frequency:  1 X week, once daily  Duration:  for 2 months tapering to 2 X a month over 3  months  Discharge Plan:  Achieve all LTG.  Independent in home treatment program.  Reach maximal therapeutic benefit.    Please refer to the daily flowsheet for treatment today, total treatment time and time spent performing 1:1 timed codes.

## 2022-05-07 PROBLEM — M25.561 RIGHT KNEE PAIN: Status: ACTIVE | Noted: 2022-05-07

## 2022-05-07 PROBLEM — R60.0 LOCALIZED EDEMA: Status: ACTIVE | Noted: 2022-05-07

## 2022-05-07 NOTE — PROGRESS NOTES
[facility]    OUTPATIENT Physical Therapy ORTHOPEDIC EVALUATION  PLAN OF TREATMENT FOR OUTPATIENT REHABILITATION  (COMPLETE FOR INITIAL CLAIMS ONLY)  Patient's Last Name, First Name, M.I.  YOB: 1975  Neville Magallanes    Provider s Name:  [facility]   Medical Record No.  [id number]   Start of Care Date:  05/03/22   Onset Date:  04/07/2204/07/22   Type:     _X__PT   ___OT Medical Diagnosis:    Encounter Diagnoses   Name Primary?    S/P ACL reconstruction Yes    Right knee pain     Localized edema         Treatment Diagnosis:  R knee ACL Reconstruction        Goals:     05/03/22 0801   Body Part   Goals listed below are for R knee   Goal #1   Goal #1 ambulation   Previous Functional Level No restrictions   Performance Level knee felt unstable   Current Functional Level with brace   Performance Level WBAT   STG Target Performance Minutes patient will be able to walk   Performance Level 20 min with normal gait pattern   Rationale to maintain proper body mechanics/posture while ambulating to avoid additional compensatory injury due to improper gait mechanics   Due Date 06/02/22    LTG Target Performance Minutes patient will be able to  walk;without assistive device;on uneven terrain; on sharp inclines/declines   Performance Level without restrictions   Rationale for safe community ambulation   Due Date 07/29/22   Goal #2   Goal #2 posture/body mechanics   Previous Functional Level Patient reports good posture and proper body mechanics   Performance level Compensated transfers, activity management   STG Target Performance Patient able to demonstrate good posture and body mechanics without prompting   Performance Level for 10 sit to stand transfers wtihout UE, proper loading through the LE   Rationale to prevent neck/back pain and avoid injury when lifting/carrying and  performing tasks requiring bending   Due Date 06/02/22   LTG Target Performance Patient able to demonstrate good posture and body mechanics without prompting   Performance Level for SLS balance EC for 30 sec, HEP independence   Rationale to prevent neck/back pain and avoid injury when lifting/carrying and performing tasks requiring bending   Due Date 08/25/22       Therapy Frequency:  1x/week for 2 month, 2x/month for 3 months  Predicted Duration of Therapy Intervention:  5 months total.  Cert for 3 months and will reass at that time for re-certification.    Molly Rivas, PT                 I CERTIFY THE NEED FOR THESE SERVICES FURNISHED UNDER        THIS PLAN OF TREATMENT AND WHILE UNDER MY CARE     (Physician attestation of this document indicates review and certification of the therapy plan).                     Certification Date From:  05/03/22   Certification Date To:  07/31/22    Referring Provider:  Casey Egan    Initial Assessment        See Epic Evaluation SOC Date: 05/03/22

## 2022-05-12 ENCOUNTER — THERAPY VISIT (OUTPATIENT)
Dept: PHYSICAL THERAPY | Facility: CLINIC | Age: 47
End: 2022-05-12
Payer: COMMERCIAL

## 2022-05-12 DIAGNOSIS — R60.0 LOCALIZED EDEMA: ICD-10-CM

## 2022-05-12 DIAGNOSIS — M25.561 RIGHT KNEE PAIN: Primary | ICD-10-CM

## 2022-05-12 DIAGNOSIS — S83.511D RUPTURE OF ANTERIOR CRUCIATE LIGAMENT OF RIGHT KNEE, SUBSEQUENT ENCOUNTER: ICD-10-CM

## 2022-05-12 PROCEDURE — 97110 THERAPEUTIC EXERCISES: CPT | Mod: GP | Performed by: PHYSICAL THERAPIST

## 2022-05-17 NOTE — PROGRESS NOTES
Chief Complaint   Patient presents with     Right Knee - Surgical Followup     Right knee anterior cruciate ligament with hamstring autograft. DOS: 4/7/22. 6 weeks. Doing well in hinged knee brace. He has been walking ~1 hour/day. Going to Physical Therapy.          SURGERY: right knee anterior cruciate ligament reconstruction with hamstring autograft.  DATE OF SURGERY: 4/7/2022.      HISTORY OF PRESENT ILLNESS:  Neville Magallanes is a 46 year old male seen for postoperative evaluation of a right knee arthroscopy and anterior cruciate ligament hamstring reconstruction, for chronic anterior cruciate ligament deficiency. Surgery occurred 6 weeks ago. Returns today stating overall doing well. Pain has been improving, not even taking tylenol anymore. Wearing hinged brace versus immobilizer. Going to Physical Therapy. Walks ~1hr per day. No issues or concerns.    OR FINDINGS:  Mild suprapatellar synovitis.  No loose bodies in the medial and lateral gutters.  Small medial plica.  Intact patellofemoral articular surfaces.  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  Lateral compartment with intact lateral meniscus with some superficial scuffing of the posterior horn without tear.  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  Intact lateral femoral articular surface.  Medial compartment with intact medial meniscus.  Grade 1 medial chondrosis.    Past Medical History:   Diagnosis Date     Uncontrolled hypertension 5/4/2021       Past Surgical History:   Procedure Laterality Date     ARTHROSCOPIC RECONSTRUCTION ANTERIOR CRUCIATE LIGAMENT Left 5/5/2016    Procedure: ARTHROSCOPIC RECONSTRUCTION ANTERIOR CRUCIATE LIGAMENT;  Surgeon: Casey Egan MD;  Location:  OR     ARTHROSCOPIC RECONSTRUCTION ANTERIOR CRUCIATE LIGAMENT Right 4/7/2022    Procedure: RECONSTRUCTION, RIGHT KNEE, ANTERIOR CRUCIATE LIGAMENT, ARTHROSCOPIC WITH HAMSTRING AUTOGRAFT;  Surgeon: Casey Egan MD;  Location:  OR  "      Medications:   Current Outpatient Medications:      amLODIPine (NORVASC) 5 MG tablet, Take 1 tablet (5 mg) by mouth daily, Disp: 30 tablet, Rfl: 4     lisinopril (ZESTRIL) 10 MG tablet, Take 1 tablet (10 mg) by mouth daily, Disp: 90 tablet, Rfl: 3     methocarbamol (ROBAXIN) 500 MG tablet, Take 2 tablets (1,000 mg) by mouth 3 times daily as needed for muscle spasms or other (pain.), Disp: 60 tablet, Rfl: 1     oxyCODONE (ROXICODONE) 5 MG tablet, Take 1-2 tablets (5-10 mg) by mouth every 8 hours as needed for moderate to severe pain, Disp: 8 tablet, Rfl: 0     senna-docusate (SENOKOT-S/PERICOLACE) 8.6-50 MG tablet, Take 1-2 tablets by mouth 2 times daily, Disp: 30 tablet, Rfl: 0     testosterone cypionate (DEPOTESTOSTERONE) 100 MG/ML injection, INJECT 0.75 MLS (75 MG) INTO THE MUSCLE ONCE A WEEK, Disp: 3 mL, Rfl: 0    Allergies: No Known Allergies    REVIEW OF SYSTEMS:   CONSTITUTIONAL:NEGATIVE for fever, chills, night sweats  INTEGUMENTARY/SKIN: NEGATIVE for worrisome wound problems or redness.  MUSCULOSKELETAL:See HPI above  NEURO: NEGATIVE for weakness, dizziness or paresthesias    PHYSICAL EXAM:  Ht 1.702 m (5' 7\")   Wt 95.3 kg (210 lb)   BMI 32.89 kg/m     GENERAL APPEARANCE: healthy, alert, no distress  SKIN: no suspicious lesions or rashes  NEURO: Normal strength and tone, mentation intact and speech normal  PSYCH:  mentation appears normal and affect normal, not anxious  RESPIRATORY: No increased work of breathing.    right  LOWER EXTREMITY:  Gait: favors the right in brace  mild Quad atrophy, strength weak  Intact sensation deep peroneal nerve, superficial peroneal nerve, med/lat tibial nerve, sural nerve, saphenous nerve  Intact EHL, EDL, TA, FHL, GS, quadriceps hamstrings and hip flexors  Toes warm and well perfused, brisk capillary refill. Palpable 2+ dp pulses.  calf soft and nttp or squeeze.  Edema: trace      right  KNEE EXAM:    Skin: intact, no ecchymosis or erythema  ROM: full extension to " 12+ flexion  Effusion: small  Tender: none.          X-RAY: 2 views right knee 5/19/2022 reviewed, showing post-surgical changes of anterior cruciate ligament reconstruction with distal lateral metallic button, femoral and tibial bone tunnels.        Impression: Neville Magallanes is a 46 year old male 6 weeks status post right knee arthroscopy and anterior cruciate ligament reconstruction with hamstring autograft, doing well.     Plan: routine postoperative knee anterior cruciate ligament hamstring autograft reconstruction    * glad to hear doing well. Ok to discontinue immobilizer/brace at night.  * Rest  * Activity modification - avoid activities that aggravate symptoms. No running, jumping, cutting, pivoting, twisting, turning, etc.  * NSAIDS - regular use for inflammation, with food, as long as no contra-indications. Please discuss with pcp if needed.  * Ice twice daily to three times daily.  * Compression wrap  * Elevation of extremity to reduce swelling  * PT for strengthening, stretching and range of motion exercises, effusion control; continue postoperative anterior cruciate ligament hamstring protocol  * return to clinic 8 weeks, sooner if needed.          Casey Egan M.D., M.S.  Dept. of Orthopaedic Surgery  [facility]

## 2022-05-19 ENCOUNTER — OFFICE VISIT (OUTPATIENT)
Dept: ORTHOPEDICS | Facility: CLINIC | Age: 47
End: 2022-05-19

## 2022-05-19 ENCOUNTER — ANCILLARY PROCEDURE (OUTPATIENT)
Dept: GENERAL RADIOLOGY | Facility: CLINIC | Age: 47
End: 2022-05-19
Attending: PHYSICIAN ASSISTANT
Payer: COMMERCIAL

## 2022-05-19 VITALS — WEIGHT: 210 LBS | BODY MASS INDEX: 32.96 KG/M2 | HEIGHT: 67 IN

## 2022-05-19 DIAGNOSIS — Z98.890 S/P ACL RECONSTRUCTION: ICD-10-CM

## 2022-05-19 DIAGNOSIS — Z98.890 S/P ACL RECONSTRUCTION: Primary | ICD-10-CM

## 2022-05-19 PROCEDURE — 99024 POSTOP FOLLOW-UP VISIT: CPT | Performed by: ORTHOPAEDIC SURGERY

## 2022-05-19 PROCEDURE — 73560 X-RAY EXAM OF KNEE 1 OR 2: CPT | Mod: TC | Performed by: RADIOLOGY

## 2022-05-19 ASSESSMENT — PAIN SCALES - GENERAL: PAINLEVEL: NO PAIN (0)

## 2022-05-19 NOTE — LETTER
5/19/2022         RE: Neville Magallanes  [address] [place] [address]        Dear Colleague,    Thank you for referring your patient, Neville Magallanes, to the [facility]. Please see a copy of my visit note below.    Chief Complaint   Patient presents with     Right Knee - Surgical Followup     Right knee anterior cruciate ligament with hamstring autograft. DOS: 4/7/22. 6 weeks. Doing well in hinged knee brace. He has been walking ~1 hour/day. Going to Physical Therapy.          SURGERY: right knee anterior cruciate ligament reconstruction with hamstring autograft.  DATE OF SURGERY: 4/7/2022.      HISTORY OF PRESENT ILLNESS:  Neville Magallanes is a 46 year old male seen for postoperative evaluation of a right knee arthroscopy and anterior cruciate ligament hamstring reconstruction, for chronic anterior cruciate ligament deficiency. Surgery occurred 6 weeks ago. Returns today stating overall doing well. Pain has been improving, not even taking tylenol anymore. Wearing hinged brace versus immobilizer. Going to Physical Therapy. Walks ~1hr per day. No issues or concerns.    OR FINDINGS:  Mild suprapatellar synovitis.  No loose bodies in the medial and lateral gutters.  Small medial plica.  Intact patellofemoral articular surfaces.  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  Lateral compartment with intact lateral meniscus with some superficial scuffing of the posterior horn without tear.  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  Intact lateral femoral articular surface.  Medial compartment with intact medial meniscus.  Grade 1 medial chondrosis.    Past Medical History:   Diagnosis Date     Uncontrolled hypertension 5/4/2021       Past Surgical History:   Procedure Laterality Date     ARTHROSCOPIC RECONSTRUCTION ANTERIOR CRUCIATE LIGAMENT Left 5/5/2016    Procedure: ARTHROSCOPIC RECONSTRUCTION ANTERIOR CRUCIATE LIGAMENT;  Surgeon: Casey Egan MD;   "Location: [facility] OR     ARTHROSCOPIC RECONSTRUCTION ANTERIOR CRUCIATE LIGAMENT Right 4/7/2022    Procedure: RECONSTRUCTION, RIGHT KNEE, ANTERIOR CRUCIATE LIGAMENT, ARTHROSCOPIC WITH HAMSTRING AUTOGRAFT;  Surgeon: Casey Egan MD;  Location: [facility] OR       Medications:   Current Outpatient Medications:      amLODIPine (NORVASC) 5 MG tablet, Take 1 tablet (5 mg) by mouth daily, Disp: 30 tablet, Rfl: 4     lisinopril (ZESTRIL) 10 MG tablet, Take 1 tablet (10 mg) by mouth daily, Disp: 90 tablet, Rfl: 3     methocarbamol (ROBAXIN) 500 MG tablet, Take 2 tablets (1,000 mg) by mouth 3 times daily as needed for muscle spasms or other (pain.), Disp: 60 tablet, Rfl: 1     oxyCODONE (ROXICODONE) 5 MG tablet, Take 1-2 tablets (5-10 mg) by mouth every 8 hours as needed for moderate to severe pain, Disp: 8 tablet, Rfl: 0     senna-docusate (SENOKOT-S/PERICOLACE) 8.6-50 MG tablet, Take 1-2 tablets by mouth 2 times daily, Disp: 30 tablet, Rfl: 0     testosterone cypionate (DEPOTESTOSTERONE) 100 MG/ML injection, INJECT 0.75 MLS (75 MG) INTO THE MUSCLE ONCE A WEEK, Disp: 3 mL, Rfl: 0    Allergies: No Known Allergies    REVIEW OF SYSTEMS:   CONSTITUTIONAL:NEGATIVE for fever, chills, night sweats  INTEGUMENTARY/SKIN: NEGATIVE for worrisome wound problems or redness.  MUSCULOSKELETAL:See HPI above  NEURO: NEGATIVE for weakness, dizziness or paresthesias    PHYSICAL EXAM:  Ht 1.702 m (5' 7\")   Wt 95.3 kg (210 lb)   BMI 32.89 kg/m     GENERAL APPEARANCE: healthy, alert, no distress  SKIN: no suspicious lesions or rashes  NEURO: Normal strength and tone, mentation intact and speech normal  PSYCH:  mentation appears normal and affect normal, not anxious  RESPIRATORY: No increased work of breathing.    right  LOWER EXTREMITY:  Gait: favors the right in brace  mild Quad atrophy, strength weak  Intact sensation deep peroneal nerve, superficial peroneal nerve, med/lat tibial nerve, sural nerve, saphenous nerve  Intact EHL, EDL, TA, FHL, GS, " quadriceps hamstrings and hip flexors  Toes warm and well perfused, brisk capillary refill. Palpable 2+ dp pulses.  calf soft and nttp or squeeze.  Edema: trace      right  KNEE EXAM:    Skin: intact, no ecchymosis or erythema  ROM: full extension to 12+ flexion  Effusion: small  Tender: none.          X-RAY: 2 views right knee 5/19/2022 reviewed, showing post-surgical changes of anterior cruciate ligament reconstruction with distal lateral metallic button, femoral and tibial bone tunnels.        Impression: Neville Magallanes is a 46 year old male 6 weeks status post right knee arthroscopy and anterior cruciate ligament reconstruction with hamstring autograft, doing well.     Plan: routine postoperative knee anterior cruciate ligament hamstring autograft reconstruction    * glad to hear doing well. Ok to discontinue immobilizer/brace at night.  * Rest  * Activity modification - avoid activities that aggravate symptoms. No running, jumping, cutting, pivoting, twisting, turning, etc.  * NSAIDS - regular use for inflammation, with food, as long as no contra-indications. Please discuss with pcp if needed.  * Ice twice daily to three times daily.  * Compression wrap  * Elevation of extremity to reduce swelling  * PT for strengthening, stretching and range of motion exercises, effusion control; continue postoperative anterior cruciate ligament hamstring protocol  * return to clinic 8 weeks, sooner if needed.          Casey Egan M.D., M.S.  Dept. of Orthopaedic Surgery  [facility]      Again, thank you for allowing me to participate in the care of your patient.        Sincerely,        Casey Egan MD

## 2022-05-20 ENCOUNTER — THERAPY VISIT (OUTPATIENT)
Dept: PHYSICAL THERAPY | Facility: CLINIC | Age: 47
End: 2022-05-20
Payer: COMMERCIAL

## 2022-05-20 DIAGNOSIS — M25.561 RIGHT KNEE PAIN: Primary | ICD-10-CM

## 2022-05-20 DIAGNOSIS — S83.511D RUPTURE OF ANTERIOR CRUCIATE LIGAMENT OF RIGHT KNEE, SUBSEQUENT ENCOUNTER: ICD-10-CM

## 2022-05-20 DIAGNOSIS — R60.0 LOCALIZED EDEMA: ICD-10-CM

## 2022-05-20 PROCEDURE — 97140 MANUAL THERAPY 1/> REGIONS: CPT | Mod: GP

## 2022-05-20 PROCEDURE — 97110 THERAPEUTIC EXERCISES: CPT | Mod: GP

## 2022-05-20 PROCEDURE — 97014 ELECTRIC STIMULATION THERAPY: CPT | Mod: GP

## 2022-06-09 ENCOUNTER — MYC REFILL (OUTPATIENT)
Dept: FAMILY MEDICINE | Facility: CLINIC | Age: 47
End: 2022-06-09

## 2022-06-09 DIAGNOSIS — I10 ESSENTIAL HYPERTENSION: ICD-10-CM

## 2022-06-10 RX ORDER — AMLODIPINE BESYLATE 5 MG/1
5 TABLET ORAL DAILY
Qty: 30 TABLET | Refills: 4 | Status: SHIPPED | OUTPATIENT
Start: 2022-06-10 | End: 2022-08-11

## 2022-06-10 NOTE — TELEPHONE ENCOUNTER
Last Written Prescription Date:  22  Last Fill Quantity: 30,  # refills: 4   Last office visit: 3/30/2022 with prescribing provider:  Dr. Husain with advised F/U in one month   Future Office Visit: none     Routing refill request to provider for review/approval because:  Prescription  (dated end date of 22 even though there are 4 refills on RX)  Due for follow up    Milagro Zhao, RN, BSN  [facility]

## 2022-07-10 ENCOUNTER — HEALTH MAINTENANCE LETTER (OUTPATIENT)
Age: 47
End: 2022-07-10

## 2022-08-11 ENCOUNTER — OFFICE VISIT (OUTPATIENT)
Dept: FAMILY MEDICINE | Facility: CLINIC | Age: 47
End: 2022-08-11
Payer: COMMERCIAL

## 2022-08-11 VITALS
TEMPERATURE: 97.5 F | DIASTOLIC BLOOD PRESSURE: 85 MMHG | WEIGHT: 192.7 LBS | BODY MASS INDEX: 30.18 KG/M2 | HEART RATE: 87 BPM | SYSTOLIC BLOOD PRESSURE: 132 MMHG | RESPIRATION RATE: 16 BRPM | OXYGEN SATURATION: 98 %

## 2022-08-11 DIAGNOSIS — U07.1 CLINICAL DIAGNOSIS OF COVID-19: Primary | ICD-10-CM

## 2022-08-11 DIAGNOSIS — I10 ESSENTIAL HYPERTENSION: ICD-10-CM

## 2022-08-11 PROCEDURE — 99213 OFFICE O/P EST LOW 20 MIN: CPT | Performed by: FAMILY MEDICINE

## 2022-08-11 RX ORDER — LISINOPRIL 10 MG/1
10 TABLET ORAL DAILY
Qty: 30 TABLET | Refills: 1 | Status: SHIPPED | OUTPATIENT
Start: 2022-08-11 | End: 2022-10-05

## 2022-08-11 ASSESSMENT — PATIENT HEALTH QUESTIONNAIRE - PHQ9
SUM OF ALL RESPONSES TO PHQ QUESTIONS 1-9: 2
SUM OF ALL RESPONSES TO PHQ QUESTIONS 1-9: 2
10. IF YOU CHECKED OFF ANY PROBLEMS, HOW DIFFICULT HAVE THESE PROBLEMS MADE IT FOR YOU TO DO YOUR WORK, TAKE CARE OF THINGS AT HOME, OR GET ALONG WITH OTHER PEOPLE: NOT DIFFICULT AT ALL

## 2022-08-11 ASSESSMENT — ANXIETY QUESTIONNAIRES
GAD7 TOTAL SCORE: 2
6. BECOMING EASILY ANNOYED OR IRRITABLE: SEVERAL DAYS
8. IF YOU CHECKED OFF ANY PROBLEMS, HOW DIFFICULT HAVE THESE MADE IT FOR YOU TO DO YOUR WORK, TAKE CARE OF THINGS AT HOME, OR GET ALONG WITH OTHER PEOPLE?: NOT DIFFICULT AT ALL
2. NOT BEING ABLE TO STOP OR CONTROL WORRYING: NOT AT ALL
7. FEELING AFRAID AS IF SOMETHING AWFUL MIGHT HAPPEN: NOT AT ALL
GAD7 TOTAL SCORE: 2
3. WORRYING TOO MUCH ABOUT DIFFERENT THINGS: NOT AT ALL
7. FEELING AFRAID AS IF SOMETHING AWFUL MIGHT HAPPEN: NOT AT ALL
GAD7 TOTAL SCORE: 2
4. TROUBLE RELAXING: NOT AT ALL
5. BEING SO RESTLESS THAT IT IS HARD TO SIT STILL: NOT AT ALL
1. FEELING NERVOUS, ANXIOUS, OR ON EDGE: SEVERAL DAYS
IF YOU CHECKED OFF ANY PROBLEMS ON THIS QUESTIONNAIRE, HOW DIFFICULT HAVE THESE PROBLEMS MADE IT FOR YOU TO DO YOUR WORK, TAKE CARE OF THINGS AT HOME, OR GET ALONG WITH OTHER PEOPLE: NOT DIFFICULT AT ALL

## 2022-08-11 ASSESSMENT — PAIN SCALES - GENERAL: PAINLEVEL: NO PAIN (0)

## 2022-08-11 NOTE — PROGRESS NOTES
"  Assessment & Plan     Clinical diagnosis of COVID-19  Patient's work forms were filled out for his absence.  Copy of forms will be scanned to chart.  Forms were faxed for him today.  Okay to return to work tomorrow with no restrictions.    Essential hypertension  This is currently well controlled.  However he does not like the amlodipine and would like to switch back to lisinopril.  Okay for this medication change but recommend close monitoring of blood pressure as lisinopril dose may need to be titrated up.  He will also need follow-up labs to check potassium and kidney function.  See patient instructions  - lisinopril (ZESTRIL) 10 MG tablet; Take 1 tablet (10 mg) by mouth daily      BMI:   Estimated body mass index is 30.18 kg/m  as calculated from the following:    Height as of 5/19/22: 1.702 m (5' 7\").    Weight as of this encounter: 87.4 kg (192 lb 11.2 oz).   Athletic build    Patient Instructions   Monitor blood pressure (goal is < 140/90)  - check blood pressure at rest for 5-10 minute  - avoid checking blood pressure within 30 minutes of caffeine or exercise.     Ok to stop amlodipine and start lisinopril 10 mg.           Return in about 1 month (around 9/11/2022) for Routine preventive, med check.    Urszula Lamb MD  [facility]    Corrina Lozaad is a 46 year old, presenting for the following health issues:  Forms      HPI     Got ill 47/25/22 and tested positive for COVID-19 infection on 7/28/22.  Brings in a copy of his positive results done at [facility].  Chills, fatigue and mild dizziness.  Coughed a lot.    Feeling good now. Much better energy. Cough comes and goes but overall much better. No dyspnea/chest pain.   Still taking it easy.   Hoping to return to work tommorr. Notes Fridays are usually lighter days and would be a good day to restart.     Works in logistics, . Sit at desk.     Currently day #18 of illness    Declines td vaccine    Only " taking amlodipine and not lisinopril. Wanting to switch back to lisinopril and stop the amlodipine as doesn't think he feels as well on amlodipine.  He knows he is due for a physical.          Objective    /85   Pulse 87   Temp 97.5  F (36.4  C) (Temporal)   Resp 16   Wt 87.4 kg (192 lb 11.2 oz)   SpO2 98%   BMI 30.18 kg/m    Body mass index is 30.18 kg/m .  Physical Exam   GENERAL: healthy, alert and no distress  NECK: no adenopathy, no asymmetry, masses, or scars and thyroid normal to palpation  RESP: lungs clear to auscultation - no rales, rhonchi or wheezes  CV: regular rate and rhythm, normal S1 S2, no S3 or S4, no murmur, click or rub, no peripheral edema and peripheral pulses strong  PSYCH: mentation appears normal, affect normal/bright                  .  ..  Answers for HPI/ROS submitted by the patient on 8/11/2022  If you checked off any problems, how difficult have these problems made it for you to do your work, take care of things at home, or get along with other people?: Not difficult at all  PHQ9 TOTAL SCORE: 2  OSMANI 7 TOTAL SCORE: 2

## 2022-08-11 NOTE — PATIENT INSTRUCTIONS
Monitor blood pressure (goal is < 140/90)  - check blood pressure at rest for 5-10 minute  - avoid checking blood pressure within 30 minutes of caffeine or exercise.     Ok to stop amlodipine and start lisinopril 10 mg.

## 2022-09-11 ENCOUNTER — HEALTH MAINTENANCE LETTER (OUTPATIENT)
Age: 47
End: 2022-09-11

## 2022-10-05 DIAGNOSIS — I10 ESSENTIAL HYPERTENSION: ICD-10-CM

## 2022-10-05 RX ORDER — LISINOPRIL 10 MG/1
10 TABLET ORAL DAILY
Qty: 30 TABLET | Refills: 0 | Status: SHIPPED | OUTPATIENT
Start: 2022-10-05 | End: 2022-11-11

## 2022-10-27 ENCOUNTER — OFFICE VISIT (OUTPATIENT)
Dept: URGENT CARE | Facility: URGENT CARE | Age: 47
End: 2022-10-27
Payer: COMMERCIAL

## 2022-10-27 VITALS
TEMPERATURE: 98 F | WEIGHT: 196 LBS | RESPIRATION RATE: 18 BRPM | HEART RATE: 112 BPM | DIASTOLIC BLOOD PRESSURE: 43 MMHG | SYSTOLIC BLOOD PRESSURE: 78 MMHG | BODY MASS INDEX: 30.7 KG/M2 | OXYGEN SATURATION: 98 %

## 2022-10-27 DIAGNOSIS — K92.1 BLACK STOOL: ICD-10-CM

## 2022-10-27 DIAGNOSIS — K92.1 BLOODY STOOL: ICD-10-CM

## 2022-10-27 DIAGNOSIS — I95.9 HYPOTENSION, UNSPECIFIED HYPOTENSION TYPE: Primary | ICD-10-CM

## 2022-10-27 PROCEDURE — 93000 ELECTROCARDIOGRAM COMPLETE: CPT | Performed by: FAMILY MEDICINE

## 2022-10-27 PROCEDURE — 99215 OFFICE O/P EST HI 40 MIN: CPT | Performed by: FAMILY MEDICINE

## 2022-11-11 ENCOUNTER — TELEPHONE (OUTPATIENT)
Dept: FAMILY MEDICINE | Facility: CLINIC | Age: 47
End: 2022-11-11

## 2022-11-11 DIAGNOSIS — I10 ESSENTIAL HYPERTENSION: ICD-10-CM

## 2022-11-11 RX ORDER — LISINOPRIL 10 MG/1
10 TABLET ORAL DAILY
Qty: 30 TABLET | Refills: 0 | Status: SHIPPED | OUTPATIENT
Start: 2022-11-11 | End: 2022-12-13

## 2022-11-11 NOTE — TELEPHONE ENCOUNTER
Mishel refill sent  Due for labs prior to further f/u  He also needs hospital f/u visit with pcp and labs could be done at that time

## 2022-11-15 NOTE — TELEPHONE ENCOUNTER
Patient states that he will schedule physical with labs another time.   Elizabeth J Barthel on 11/15/2022 at 5:32 PM

## 2022-12-13 ENCOUNTER — TELEPHONE (OUTPATIENT)
Dept: FAMILY MEDICINE | Facility: CLINIC | Age: 47
End: 2022-12-13

## 2022-12-13 DIAGNOSIS — I10 ESSENTIAL HYPERTENSION: ICD-10-CM

## 2022-12-13 RX ORDER — LISINOPRIL 10 MG/1
TABLET ORAL
Qty: 15 TABLET | Refills: 0 | Status: SHIPPED | OUTPATIENT
Start: 2022-12-13 | End: 2023-01-05

## 2022-12-13 NOTE — TELEPHONE ENCOUNTER
Overdue for f/u since this med was started.   Will send in julius 2 week fill but he ntbs with pcp, myself or other provider to f/u this med and get utd labs. He is also due for hospital f/u visit

## 2022-12-15 NOTE — TELEPHONE ENCOUNTER
Called and informed pt of below. He will call to set up appt after he recovers from his upcoming procedure.  Kristie Low CMA

## 2023-01-05 DIAGNOSIS — I10 ESSENTIAL HYPERTENSION: ICD-10-CM

## 2023-01-05 RX ORDER — LISINOPRIL 10 MG/1
TABLET ORAL
Qty: 15 TABLET | Refills: 0 | Status: SHIPPED | OUTPATIENT
Start: 2023-01-05 | End: 2023-03-15

## 2023-03-08 ENCOUNTER — TRANSFERRED RECORDS (OUTPATIENT)
Dept: MULTI SPECIALTY CLINIC | Facility: CLINIC | Age: 48
End: 2023-03-08
Payer: COMMERCIAL

## 2023-03-15 ENCOUNTER — OFFICE VISIT (OUTPATIENT)
Dept: FAMILY MEDICINE | Facility: CLINIC | Age: 48
End: 2023-03-15
Payer: COMMERCIAL

## 2023-03-15 VITALS
RESPIRATION RATE: 22 BRPM | WEIGHT: 180.4 LBS | TEMPERATURE: 97.7 F | OXYGEN SATURATION: 100 % | HEIGHT: 68 IN | SYSTOLIC BLOOD PRESSURE: 128 MMHG | BODY MASS INDEX: 27.34 KG/M2 | HEART RATE: 74 BPM | DIASTOLIC BLOOD PRESSURE: 88 MMHG

## 2023-03-15 DIAGNOSIS — Z79.899 ENCOUNTER FOR LONG-TERM (CURRENT) USE OF MEDICATIONS: ICD-10-CM

## 2023-03-15 DIAGNOSIS — Z00.00 ROUTINE GENERAL MEDICAL EXAMINATION AT A HEALTH CARE FACILITY: Primary | ICD-10-CM

## 2023-03-15 DIAGNOSIS — K21.00 GASTROESOPHAGEAL REFLUX DISEASE WITH ESOPHAGITIS WITHOUT HEMORRHAGE: ICD-10-CM

## 2023-03-15 DIAGNOSIS — Z13.220 SCREENING FOR HYPERLIPIDEMIA: ICD-10-CM

## 2023-03-15 DIAGNOSIS — K44.9 HIATAL HERNIA: ICD-10-CM

## 2023-03-15 DIAGNOSIS — Z78.9 NON-SMOKER: ICD-10-CM

## 2023-03-15 DIAGNOSIS — E78.5 HYPERLIPIDEMIA LDL GOAL <130: ICD-10-CM

## 2023-03-15 DIAGNOSIS — I10 ESSENTIAL HYPERTENSION: ICD-10-CM

## 2023-03-15 DIAGNOSIS — Z87.19 HISTORY OF UPPER GASTROINTESTINAL BLEEDING: ICD-10-CM

## 2023-03-15 PROBLEM — R57.8 HEMORRHAGIC SHOCK (H): Status: ACTIVE | Noted: 2022-10-27

## 2023-03-15 PROBLEM — K92.2 UPPER GI BLEED: Status: ACTIVE | Noted: 2022-10-27

## 2023-03-15 PROCEDURE — 80048 BASIC METABOLIC PNL TOTAL CA: CPT | Performed by: STUDENT IN AN ORGANIZED HEALTH CARE EDUCATION/TRAINING PROGRAM

## 2023-03-15 PROCEDURE — 99396 PREV VISIT EST AGE 40-64: CPT | Performed by: STUDENT IN AN ORGANIZED HEALTH CARE EDUCATION/TRAINING PROGRAM

## 2023-03-15 PROCEDURE — 80061 LIPID PANEL: CPT | Performed by: STUDENT IN AN ORGANIZED HEALTH CARE EDUCATION/TRAINING PROGRAM

## 2023-03-15 PROCEDURE — 99214 OFFICE O/P EST MOD 30 MIN: CPT | Mod: 25 | Performed by: STUDENT IN AN ORGANIZED HEALTH CARE EDUCATION/TRAINING PROGRAM

## 2023-03-15 PROCEDURE — 36415 COLL VENOUS BLD VENIPUNCTURE: CPT | Performed by: STUDENT IN AN ORGANIZED HEALTH CARE EDUCATION/TRAINING PROGRAM

## 2023-03-15 RX ORDER — OMEPRAZOLE 40 MG/1
40 CAPSULE, DELAYED RELEASE ORAL DAILY
Qty: 30 CAPSULE | Refills: 6 | Status: SHIPPED | OUTPATIENT
Start: 2023-03-15

## 2023-03-15 RX ORDER — CHLORAL HYDRATE 500 MG
1 CAPSULE ORAL DAILY
COMMUNITY

## 2023-03-15 RX ORDER — LISINOPRIL 10 MG/1
10 TABLET ORAL DAILY
Qty: 90 TABLET | Refills: 1 | Status: SHIPPED | OUTPATIENT
Start: 2023-03-15 | End: 2023-06-19

## 2023-03-15 ASSESSMENT — ENCOUNTER SYMPTOMS
DYSURIA: 0
COUGH: 0
HEADACHES: 0
PARESTHESIAS: 1
HEMATOCHEZIA: 0
FEVER: 0
JOINT SWELLING: 0
SORE THROAT: 0
FREQUENCY: 0
HEMATURIA: 0
NERVOUS/ANXIOUS: 0
MYALGIAS: 0
NAUSEA: 0
ABDOMINAL PAIN: 0
CONSTIPATION: 0
DIARRHEA: 0
WEAKNESS: 0
SHORTNESS OF BREATH: 0
PALPITATIONS: 0
CHILLS: 0
DIZZINESS: 0
EYE PAIN: 0
ARTHRALGIAS: 0
HEARTBURN: 0

## 2023-03-15 ASSESSMENT — PATIENT HEALTH QUESTIONNAIRE - PHQ9
SUM OF ALL RESPONSES TO PHQ QUESTIONS 1-9: 0
10. IF YOU CHECKED OFF ANY PROBLEMS, HOW DIFFICULT HAVE THESE PROBLEMS MADE IT FOR YOU TO DO YOUR WORK, TAKE CARE OF THINGS AT HOME, OR GET ALONG WITH OTHER PEOPLE: NOT DIFFICULT AT ALL
SUM OF ALL RESPONSES TO PHQ QUESTIONS 1-9: 0

## 2023-03-15 ASSESSMENT — PAIN SCALES - GENERAL: PAINLEVEL: NO PAIN (0)

## 2023-03-15 NOTE — Clinical Note
Colonoscopy done on this date: March 8, 2023 (approximately), by this group: [facility], results: 3 polyps. Verbal from patient.

## 2023-03-15 NOTE — Clinical Note
Colonoscopy done on this date: March 8, 2023 (approximately), by this group: [facility], results: 3 polyps.

## 2023-03-15 NOTE — PROGRESS NOTES
SUBJECTIVE:   CC: Neville is an 47 year old who presents for preventative health visit.   Patient has been advised of split billing requirements and indicates understanding: Yes  Healthy Habits:     Getting at least 3 servings of Calcium per day:  NO    Bi-annual eye exam:  Yes    Dental care twice a year:  NO    Sleep apnea or symptoms of sleep apnea:  None    Diet:  Carbohydrate counting and Other    Frequency of exercise:  4-5 days/week    Duration of exercise:  45-60 minutes    Taking medications regularly:  Yes    Medication side effects:  None    PHQ-2 Total Score: 0    Additional concerns today:  No    Patient would still like to discuss the followin.) patient had a transfusion about 4 months ago for a GI bleed, now having itching all over body that only happens once in a while when he wakes up or at the beginning of exercise. The pruritus resolves within few second on onset  2.) Patient would like a refill on lisinopril, would like 60 tablets vs 30 tablets.  luke uses creatinine powder for work out and has bulky muscle mass  3.) Questions about omeprazole.  Patient had an upper GI bleed last year.      Colonoscopy done on this date: 2023 (approximately), by this group: [facility], results: 3 polyps.           Today's PHQ-2 Score:   PHQ-2 (  [employer]) 3/15/2023   Q1: Little interest or pleasure in doing things 0   Q2: Feeling down, depressed or hopeless 0   PHQ-2 Score 0   PHQ-2 Total Score (12-17 Years)- Positive if 3 or more points; Administer PHQ-A if positive -   Q1: Little interest or pleasure in doing things Not at all   Q2: Feeling down, depressed or hopeless Not at all   PHQ-2 Score 0           Social History     Tobacco Use     Smoking status: Former     Packs/day: 0.00     Years: 0.00     Pack years: 0.00     Types: Cigarettes     Smokeless tobacco: Never     Tobacco comments:     Uses nicotine gum   Substance Use Topics     Alcohol use: Yes     Alcohol/week: 0.0  standard drinks     Comment: social         Alcohol Use 3/15/2023   Prescreen: >3 drinks/day or >7 drinks/week? Yes   AUDIT SCORE  8       Last PSA:   PSA   Date Value Ref Range Status   05/04/2021 1.06 0 - 4 ug/L Final     Comment:     Assay Method:  Chemiluminescence using Siemens Vista analyzer       Reviewed orders with patient. Reviewed health maintenance and updated orders accordingly - Yes  Lab work is in process  Labs reviewed in EPIC    Reviewed and updated as needed this visit by clinical staff   Tobacco  Allergies  Meds  Problems  Med Hx  Surg Hx  Fam Hx          Reviewed and updated as needed this visit by Provider   Tobacco  Allergies  Meds  Problems  Med Hx  Surg Hx  Fam Hx         Past Medical History:   Diagnosis Date     Uncontrolled hypertension 5/4/2021      Past Surgical History:   Procedure Laterality Date     ARTHROSCOPIC RECONSTRUCTION ANTERIOR CRUCIATE LIGAMENT Left 5/5/2016    Procedure: ARTHROSCOPIC RECONSTRUCTION ANTERIOR CRUCIATE LIGAMENT;  Surgeon: Casey Egan MD;  Location: [facility] OR     ARTHROSCOPIC RECONSTRUCTION ANTERIOR CRUCIATE LIGAMENT Right 4/7/2022    Procedure: RECONSTRUCTION, RIGHT KNEE, ANTERIOR CRUCIATE LIGAMENT, ARTHROSCOPIC WITH HAMSTRING AUTOGRAFT;  Surgeon: Casey Egan MD;  Location: [facility] OR       Review of Systems   Constitutional: Negative for chills and fever.   HENT: Negative for congestion, ear pain, hearing loss and sore throat.    Eyes: Negative for pain and visual disturbance.   Respiratory: Negative for cough and shortness of breath.    Cardiovascular: Negative for chest pain, palpitations and peripheral edema.   Gastrointestinal: Negative for abdominal pain, constipation, diarrhea, heartburn, hematochezia and nausea.   Genitourinary: Negative for dysuria, frequency, genital sores, hematuria, impotence, penile discharge and urgency.   Musculoskeletal: Negative for arthralgias, joint swelling and myalgias.   Skin: Negative for rash.  "  Neurological: Positive for paresthesias. Negative for dizziness, weakness and headaches.   Psychiatric/Behavioral: Negative for mood changes. The patient is not nervous/anxious.          OBJECTIVE:   /88   Pulse 74   Temp 97.7  F (36.5  C) (Temporal)   Resp 22   Ht 1.718 m (5' 7.64\")   Wt 81.8 kg (180 lb 6.4 oz)   SpO2 100%   BMI 27.72 kg/m      Physical Exam  GENERAL: healthy, alert and no distress  EYES: Eyes grossly normal to inspection, PERRL and conjunctivae and sclerae normal  HENT: ear canals and TM's normal, nose and mouth without ulcers or lesions  NECK: no adenopathy, no asymmetry, masses, or scars and thyroid normal to palpation  RESP: lungs clear to auscultation - no rales, rhonchi or wheezes  CV: regular rate and rhythm, normal S1 S2, no S3 or S4, no murmur, click or rub, no peripheral edema and peripheral pulses strong  ABDOMEN: soft, nontender, no hepatosplenomegaly, no masses and bowel sounds normal  MS: no gross musculoskeletal defects noted, no edema  SKIN: no suspicious lesions or rashes  NEURO: Normal strength and tone, mentation intact and speech normal  PSYCH: mentation appears normal, affect normal/bright      ASSESSMENT/PLAN:   1. Routine general medical examination at a health care facility      2. Essential hypertension    - lisinopril (ZESTRIL) 10 MG tablet; Take 1 tablet (10 mg) by mouth daily  Dispense: 90 tablet; Refill: 1  - Basic metabolic panel  (Ca, Cl, CO2, Creat, Gluc, K, Na, BUN); Future  - Basic metabolic panel  (Ca, Cl, CO2, Creat, Gluc, K, Na, BUN)    3. History of upper gastrointestinal bleeding    - omeprazole (PRILOSEC) 40 MG DR capsule; Take 1 capsule (40 mg) by mouth daily  Dispense: 30 capsule; Refill: 6    4. Encounter for long-term (current) use of medications    5. Screening for hyperlipidemia      6. Hyperlipidemia LDL goal <130    - Lipid panel reflex to direct LDL Fasting; Future  - Lipid panel reflex to direct LDL Fasting    7. Non-smoker       " "  Patient has been advised of split billing requirements and indicates understanding: Yes      COUNSELING:   Reviewed preventive health counseling, as reflected in patient instructions      BMI:   Estimated body mass index is 27.72 kg/m  as calculated from the following:    Height as of this encounter: 1.718 m (5' 7.64\").    Weight as of this encounter: 81.8 kg (180 lb 6.4 oz).   Weight management plan: Discussed healthy diet and exercise guidelines      He reports that he has quit smoking. His smoking use included cigarettes. He has never used smokeless tobacco.      Drea Husain MD  [facility]  Answers for HPI/ROS submitted by the patient on 3/15/2023  If you checked off any problems, how difficult have these problems made it for you to do your work, take care of things at home, or get along with other people?: Not difficult at all  PHQ9 TOTAL SCORE: 0      "

## 2023-03-16 LAB
ANION GAP SERPL CALCULATED.3IONS-SCNC: 7 MMOL/L (ref 3–14)
BUN SERPL-MCNC: 14 MG/DL (ref 7–30)
CALCIUM SERPL-MCNC: 9.6 MG/DL (ref 8.5–10.1)
CHLORIDE BLD-SCNC: 103 MMOL/L (ref 94–109)
CHOLEST SERPL-MCNC: 312 MG/DL
CO2 SERPL-SCNC: 27 MMOL/L (ref 20–32)
CREAT SERPL-MCNC: 1.13 MG/DL (ref 0.66–1.25)
FASTING STATUS PATIENT QL REPORTED: YES
GFR SERPL CREATININE-BSD FRML MDRD: 80 ML/MIN/1.73M2
GLUCOSE BLD-MCNC: 85 MG/DL (ref 70–99)
HDLC SERPL-MCNC: 44 MG/DL
LDLC SERPL CALC-MCNC: 233 MG/DL
NONHDLC SERPL-MCNC: 268 MG/DL
POTASSIUM BLD-SCNC: 4.2 MMOL/L (ref 3.4–5.3)
SODIUM SERPL-SCNC: 137 MMOL/L (ref 133–144)
TRIGL SERPL-MCNC: 177 MG/DL

## 2023-05-16 PROBLEM — M25.561 RIGHT KNEE PAIN: Status: RESOLVED | Noted: 2022-05-07 | Resolved: 2023-05-16

## 2023-05-16 PROBLEM — S83.511D RUPTURE OF ANTERIOR CRUCIATE LIGAMENT OF RIGHT KNEE, SUBSEQUENT ENCOUNTER: Status: RESOLVED | Noted: 2022-02-07 | Resolved: 2023-05-16

## 2023-05-16 NOTE — PROGRESS NOTES
DISCHARGE SUMMARY    Neville Magallanes was seen 3 times for evaluation and treatment.  Patient did not return for further treatment and current status is unknown.  Due to short treatment duration, no objective or functional changes were made.  Please see goal flow sheet from episode noted date below and initial evaluation for further information.  Patient is discharged from therapy and therapy episode is resolved as of 05/16/23.      Linked Episodes   Type: Episode: Status: Noted: Resolved: Last update: Updated by:   PHYSICAL THERAPY [id number] Active 5/3/2022 5/16/23 5/20/2022 12:02 PM Elsie Villela PT      Comments:

## 2023-06-18 DIAGNOSIS — I10 ESSENTIAL HYPERTENSION: ICD-10-CM

## 2023-06-19 RX ORDER — LISINOPRIL 10 MG/1
TABLET ORAL
Qty: 90 TABLET | Refills: 0 | Status: SHIPPED | OUTPATIENT
Start: 2023-06-19 | End: 2023-11-22

## 2023-07-19 ENCOUNTER — E-VISIT (OUTPATIENT)
Dept: FAMILY MEDICINE | Facility: CLINIC | Age: 48
End: 2023-07-19

## 2023-07-19 ENCOUNTER — TELEPHONE (OUTPATIENT)
Dept: FAMILY MEDICINE | Facility: CLINIC | Age: 48
End: 2023-07-19

## 2023-07-19 DIAGNOSIS — Z11.59 NEED FOR HEPATITIS B SCREENING TEST: ICD-10-CM

## 2023-07-19 DIAGNOSIS — Z11.3 ROUTINE SCREENING FOR STI (SEXUALLY TRANSMITTED INFECTION): Primary | ICD-10-CM

## 2023-07-19 PROCEDURE — 86706 HEP B SURFACE ANTIBODY: CPT | Performed by: STUDENT IN AN ORGANIZED HEALTH CARE EDUCATION/TRAINING PROGRAM

## 2023-07-19 PROCEDURE — 36415 COLL VENOUS BLD VENIPUNCTURE: CPT | Performed by: STUDENT IN AN ORGANIZED HEALTH CARE EDUCATION/TRAINING PROGRAM

## 2023-07-19 PROCEDURE — 87340 HEPATITIS B SURFACE AG IA: CPT | Performed by: STUDENT IN AN ORGANIZED HEALTH CARE EDUCATION/TRAINING PROGRAM

## 2023-07-19 PROCEDURE — 87491 CHLMYD TRACH DNA AMP PROBE: CPT | Performed by: STUDENT IN AN ORGANIZED HEALTH CARE EDUCATION/TRAINING PROGRAM

## 2023-07-19 PROCEDURE — 86780 TREPONEMA PALLIDUM: CPT | Performed by: STUDENT IN AN ORGANIZED HEALTH CARE EDUCATION/TRAINING PROGRAM

## 2023-07-19 PROCEDURE — 87591 N.GONORRHOEAE DNA AMP PROB: CPT | Performed by: STUDENT IN AN ORGANIZED HEALTH CARE EDUCATION/TRAINING PROGRAM

## 2023-07-19 PROCEDURE — 87341 HEP B SURFACE AG NEUTRLZJ IA: CPT | Performed by: STUDENT IN AN ORGANIZED HEALTH CARE EDUCATION/TRAINING PROGRAM

## 2023-07-19 PROCEDURE — 86803 HEPATITIS C AB TEST: CPT | Performed by: STUDENT IN AN ORGANIZED HEALTH CARE EDUCATION/TRAINING PROGRAM

## 2023-07-19 PROCEDURE — 86704 HEP B CORE ANTIBODY TOTAL: CPT | Performed by: STUDENT IN AN ORGANIZED HEALTH CARE EDUCATION/TRAINING PROGRAM

## 2023-07-19 PROCEDURE — 99207 PR NON-BILLABLE SERV PER CHARTING: CPT | Performed by: STUDENT IN AN ORGANIZED HEALTH CARE EDUCATION/TRAINING PROGRAM

## 2023-07-19 PROCEDURE — 87389 HIV-1 AG W/HIV-1&-2 AB AG IA: CPT | Performed by: STUDENT IN AN ORGANIZED HEALTH CARE EDUCATION/TRAINING PROGRAM

## 2023-07-19 NOTE — TELEPHONE ENCOUNTER
I need a general STD testing. No need for a face to face visit, but need to know if I can just go in to the lab (walk-in) after the orders are put in.  wanna do this sooner than later     call   [phone]  Ok to leave message

## 2023-07-19 NOTE — TELEPHONE ENCOUNTER
Provider E-Visit time total (minutes): 5    Order has been placed and patient has been notified in person.

## 2023-07-19 NOTE — TELEPHONE ENCOUNTER
Reason for Call:  Other call back    Detailed comments:  Patient needs STD order    Phone Number Patient can be reached at: Home number on file [phone] (home)    Best Time:  Any     Can we leave a detailed message on this number? YES    Call taken on 7/19/2023 at 8:31 AM by Amanda Obando

## 2023-07-19 NOTE — TELEPHONE ENCOUNTER
Per the direction of Dr. Lubin, Patient contacted by phone & instructed to complete an E-visit.     He verbalized a good understanding and said he will submit the E-visit now.     Yessenia Wetzel RN BSN  [facility]

## 2023-07-20 LAB
C TRACH DNA SPEC QL NAA+PROBE: NEGATIVE
HBV CORE AB SERPL QL IA: NONREACTIVE
HBV SURFACE AB SERPL IA-ACNC: 0.3 M[IU]/ML
HBV SURFACE AB SERPL IA-ACNC: NONREACTIVE M[IU]/ML
HBV SURFACE AG SERPL QL IA: REACTIVE
HCV AB SERPL QL IA: NONREACTIVE
HIV 1+2 AB+HIV1 P24 AG SERPL QL IA: NONREACTIVE
N GONORRHOEA DNA SPEC QL NAA+PROBE: NEGATIVE
T PALLIDUM AB SER QL: NONREACTIVE

## 2023-07-25 ENCOUNTER — MYC MEDICAL ADVICE (OUTPATIENT)
Dept: FAMILY MEDICINE | Facility: CLINIC | Age: 48
End: 2023-07-25
Payer: COMMERCIAL

## 2023-07-26 NOTE — TELEPHONE ENCOUNTER
I called patient about result. I have ordered more labs . He will schedule a lab only appointment.

## 2023-07-27 ENCOUNTER — LAB (OUTPATIENT)
Dept: LAB | Facility: CLINIC | Age: 48
End: 2023-07-27
Payer: COMMERCIAL

## 2023-07-27 DIAGNOSIS — R76.8 HEPATITIS B SURFACE ANTIGEN POSITIVE: ICD-10-CM

## 2023-07-27 LAB
ALBUMIN SERPL BCG-MCNC: 4.6 G/DL (ref 3.5–5.2)
ALP SERPL-CCNC: 45 U/L (ref 40–129)
ALT SERPL W P-5'-P-CCNC: 52 U/L (ref 0–70)
AST SERPL W P-5'-P-CCNC: 40 U/L (ref 0–45)
BILIRUB DIRECT SERPL-MCNC: <0.2 MG/DL (ref 0–0.3)
BILIRUB SERPL-MCNC: 0.4 MG/DL
HBV CORE IGM SERPL QL IA: NONREACTIVE
PROT SERPL-MCNC: 7.2 G/DL (ref 6.4–8.3)

## 2023-07-27 PROCEDURE — 86705 HEP B CORE ANTIBODY IGM: CPT

## 2023-07-27 PROCEDURE — 86707 HEPATITIS BE ANTIBODY: CPT | Mod: 90

## 2023-07-27 PROCEDURE — 99000 SPECIMEN HANDLING OFFICE-LAB: CPT

## 2023-07-27 PROCEDURE — 36415 COLL VENOUS BLD VENIPUNCTURE: CPT

## 2023-07-27 PROCEDURE — 87517 HEPATITIS B DNA QUANT: CPT

## 2023-07-27 PROCEDURE — 80076 HEPATIC FUNCTION PANEL: CPT

## 2023-07-28 LAB — HBV E AB SERPL QL IA: NEGATIVE

## 2023-07-29 ENCOUNTER — MYC MEDICAL ADVICE (OUTPATIENT)
Dept: FAMILY MEDICINE | Facility: CLINIC | Age: 48
End: 2023-07-29
Payer: COMMERCIAL

## 2023-07-29 DIAGNOSIS — R76.8 HEPATITIS B SURFACE ANTIGEN POSITIVE: Primary | ICD-10-CM

## 2023-07-29 LAB — HBV DNA SERPL NAA+PROBE-ACNC: NOT DETECTED IU/ML

## 2023-08-02 DIAGNOSIS — R76.8 HEPATITIS B SURFACE ANTIGEN POSITIVE: Primary | ICD-10-CM

## 2023-08-02 PROCEDURE — 99207 E-CONSULT TO GASTROENTEROLOGY (ADULT OUTPT PROVIDER TO SPECIALIST WRITTEN QUESTION & RESPONSE): CPT | Performed by: STUDENT IN AN ORGANIZED HEALTH CARE EDUCATION/TRAINING PROGRAM

## 2023-08-03 ENCOUNTER — E-CONSULT (OUTPATIENT)
Dept: GASTROENTEROLOGY | Facility: CLINIC | Age: 48
End: 2023-08-03
Payer: COMMERCIAL

## 2023-08-03 PROCEDURE — 99451 NTRPROF PH1/NTRNET/EHR 5/>: CPT | Performed by: INTERNAL MEDICINE

## 2023-08-03 NOTE — PROGRESS NOTES
8/3/2023     E-Consult has been accepted.    Interprofessional consultation requested by:  Drea Husain MD      Clinical Question/Purpose: MY CLINICAL QUESTION IS: Patient results showed reactive hepatitis b surface antigen. Hepatitis B core antibody,surface antibody, dna, Be antigen and Hep B core antibody  IGM were negative.    Patient assessment and information reviewed:     Isolated hepatitis B surface Ag positive (negative B s Ab, B core Ab, B core IgM, B e Ab, HBV viral load)  Normal LFTs  Presumed risk exposure that led to testing    Overall labs are most concerning for false positive for hep B s Ag. Recommend rechecking hep B s Ag and viral load in 1-2 weeks. If hep B s Ag is negative then no further evaluation is needed. If iti s still positive then would refer to hepatology clinic for further evalution.    Recommendations:   -Recommend rechecking hep B s Ag and viral load in 1-2 weeks. If hep B s Ag is negative then no further evaluation is needed. If iti s still positive then would refer to hepatology clinic for further evalution.      The recommendations provided in this E-Consult are based on a review of clinical data pertinent to the clinical question presented, without a review of the patient's complete medical record or, the benefit of a comprehensive in-person or virtual patient evaluation. This consultation should not replace the clinical judgement and evaluation of the provider ordering this E-Consult. Any new clinical issues, or changes in patient status since the filing of this E-Consult will need to be taken into account when assessing these recommendations. Please contact me if you have further questions.    My total time spent reviewing clinical information and formulating assessment was 25 minutes.        Doroteo Wilkerson MD

## 2023-08-17 ENCOUNTER — MYC MEDICAL ADVICE (OUTPATIENT)
Dept: FAMILY MEDICINE | Facility: CLINIC | Age: 48
End: 2023-08-17
Payer: COMMERCIAL

## 2023-11-22 DIAGNOSIS — I10 ESSENTIAL HYPERTENSION: ICD-10-CM

## 2023-11-22 RX ORDER — LISINOPRIL 10 MG/1
TABLET ORAL
Qty: 90 TABLET | Refills: 0 | Status: SHIPPED | OUTPATIENT
Start: 2023-11-22 | End: 2024-02-26

## 2024-02-26 DIAGNOSIS — I10 ESSENTIAL HYPERTENSION: ICD-10-CM

## 2024-02-26 RX ORDER — LISINOPRIL 10 MG/1
10 TABLET ORAL DAILY
Qty: 90 TABLET | Refills: 0 | Status: SHIPPED | OUTPATIENT
Start: 2024-02-26 | End: 2024-05-28

## 2024-05-04 ENCOUNTER — HEALTH MAINTENANCE LETTER (OUTPATIENT)
Age: 49
End: 2024-05-04

## 2024-05-28 DIAGNOSIS — I10 ESSENTIAL HYPERTENSION: ICD-10-CM

## 2024-05-28 RX ORDER — LISINOPRIL 10 MG/1
TABLET ORAL
Qty: 90 TABLET | Refills: 0 | OUTPATIENT
Start: 2024-05-28

## 2024-05-28 RX ORDER — LISINOPRIL 10 MG/1
TABLET ORAL
Qty: 90 TABLET | Refills: 0 | Status: SHIPPED | OUTPATIENT
Start: 2024-05-28

## 2024-08-30 ENCOUNTER — TELEPHONE (OUTPATIENT)
Dept: FAMILY MEDICINE | Facility: CLINIC | Age: 49
End: 2024-08-30
Payer: COMMERCIAL

## 2024-08-30 DIAGNOSIS — I10 ESSENTIAL HYPERTENSION: ICD-10-CM

## 2024-08-30 NOTE — LETTER
September 6, 2024      Neville Magallanes  [id number] [facility] [id number]        Dear Neville,       We have been attempting to reach you. You are overdue for a visit with your provider for further refills of your medications. Please call the clinic at the number listed to schedule an appointment.       Thank you for choosing us your partner in health care.     Your [facility] Team

## 2024-08-31 NOTE — TELEPHONE ENCOUNTER
Needs appt with PCP and fasting labs for further refills.  No refill will be given until schedules appointment.  Once scheduled appointment short refill will be provided.    Kathy HARRY

## 2024-09-05 NOTE — TELEPHONE ENCOUNTER
[employer] message sent to patient regarding the message below per PABLO Glass, CNP.    Will wait and see if he reads his [employer] message.    Alexa MCCLURE RN  Triage Nurse  [facility]

## 2024-09-06 RX ORDER — LISINOPRIL 10 MG/1
TABLET ORAL
Qty: 90 TABLET | Refills: 0 | OUTPATIENT
Start: 2024-09-06

## 2024-09-06 NOTE — TELEPHONE ENCOUNTER
Team     Please print and mail cued letter to address on file. Ok to close encounter once complete.     Thanks,  LIVIA Byers  [facility]

## 2025-05-17 ENCOUNTER — HEALTH MAINTENANCE LETTER (OUTPATIENT)
Age: 50
End: 2025-05-17

## (undated) DEVICE — SOL WATER IRRIG 1000ML BOTTLE 07139-09

## (undated) DEVICE — Device

## (undated) DEVICE — GLOVE PROTEXIS POWDER FREE 8.0 ORTHOPEDIC 2D73ET80

## (undated) DEVICE — GLOVE PROTEXIS W/NEU-THERA 8.0  2D73TE80

## (undated) DEVICE — BNDG ELASTIC 6"X5YDS UNSTERILE 6611-60

## (undated) DEVICE — DRSG STERI STRIP 1/2X4" R1547

## (undated) DEVICE — DRAPE IOBAN INCISE 23X17" 6650EZ

## (undated) DEVICE — GLOVE PROTEXIS BLUE W/NEU-THERA 7.5  2D73EB75

## (undated) DEVICE — SU ULTRABRAID 2 38" 7210914

## (undated) DEVICE — SU PROLENE 3-0 PS-2 18" 8687H

## (undated) DEVICE — PACK ACL SUPPLEMENT STD

## (undated) DEVICE — SUTURE ANCHOR ULTRABRAID BLUE 2MMX38" 72202965

## (undated) DEVICE — BNDG ESMARK 6" STERILE LF 820-3612

## (undated) DEVICE — PREP CHLORAPREP 26ML TINTED ORANGE  260815

## (undated) DEVICE — SUTURE

## (undated) DEVICE — GLOVE PROTEXIS W/NEU-THERA 7.5  2D73TE75

## (undated) DEVICE — BLADE DYONICS INCISOR PLUS ELITE 3.5MM 72200095

## (undated) DEVICE — TUBING SUCTION 10'X3/16" N510

## (undated) DEVICE — SOL NACL 0.9% IRRIG 3000ML BAG 07972-08

## (undated) DEVICE — SU COBRAID ULTRABRAID 2 38" 7210915

## (undated) DEVICE — SU MONOCRYL 3-0 SH 27" Y316H

## (undated) DEVICE — SU UMBILICAL TAPE 18X.125" U10T

## (undated) DEVICE — DRAPE SHEET 3/4 78X60"

## (undated) DEVICE — DRAPE C-ARM MINI 5423

## (undated) DEVICE — PACK ARTHROSCOPY KNEE SOP15AKFSM

## (undated) DEVICE — NDL COUNTER 20CT 31142493

## (undated) DEVICE — SU VICRYL 0 CT-2 27" UND J270H

## (undated) DEVICE — DRAPE STERI U 1015

## (undated) RX ORDER — BUPIVACAINE HYDROCHLORIDE 2.5 MG/ML
INJECTION, SOLUTION INFILTRATION; PERINEURAL
Status: DISPENSED
Start: 2022-04-07

## (undated) RX ORDER — KETAMINE HYDROCHLORIDE 50 MG/ML
INJECTION, SOLUTION INTRAMUSCULAR; INTRAVENOUS
Status: DISPENSED
Start: 2022-04-07

## (undated) RX ORDER — FENTANYL CITRATE 50 UG/ML
INJECTION, SOLUTION INTRAMUSCULAR; INTRAVENOUS
Status: DISPENSED
Start: 2022-04-07

## (undated) RX ORDER — DEXAMETHASONE SODIUM PHOSPHATE 4 MG/ML
INJECTION, SOLUTION INTRA-ARTICULAR; INTRALESIONAL; INTRAMUSCULAR; INTRAVENOUS; SOFT TISSUE
Status: DISPENSED
Start: 2022-04-07

## (undated) RX ORDER — OXYCODONE HYDROCHLORIDE 5 MG/1
TABLET ORAL
Status: DISPENSED
Start: 2022-04-07

## (undated) RX ORDER — CEFAZOLIN SODIUM 1 G/3ML
INJECTION, POWDER, FOR SOLUTION INTRAMUSCULAR; INTRAVENOUS
Status: DISPENSED
Start: 2022-04-07

## (undated) RX ORDER — BUPIVACAINE HYDROCHLORIDE AND EPINEPHRINE 2.5; 5 MG/ML; UG/ML
INJECTION, SOLUTION EPIDURAL; INFILTRATION; INTRACAUDAL; PERINEURAL
Status: DISPENSED
Start: 2022-04-07

## (undated) RX ORDER — EPINEPHRINE 1 MG/ML
INJECTION, SOLUTION INTRAMUSCULAR; SUBCUTANEOUS
Status: DISPENSED
Start: 2022-04-07

## (undated) RX ORDER — PROPOFOL 10 MG/ML
INJECTION, EMULSION INTRAVENOUS
Status: DISPENSED
Start: 2022-04-07

## (undated) RX ORDER — LIDOCAINE HYDROCHLORIDE 10 MG/ML
INJECTION, SOLUTION EPIDURAL; INFILTRATION; INTRACAUDAL; PERINEURAL
Status: DISPENSED
Start: 2022-04-07

## (undated) RX ORDER — ACETAMINOPHEN 325 MG/1
TABLET ORAL
Status: DISPENSED
Start: 2022-04-07

## (undated) RX ORDER — ONDANSETRON 2 MG/ML
INJECTION INTRAMUSCULAR; INTRAVENOUS
Status: DISPENSED
Start: 2022-04-07

## (undated) RX ORDER — LIDOCAINE HYDROCHLORIDE 20 MG/ML
INJECTION, SOLUTION INFILTRATION; PERINEURAL
Status: DISPENSED
Start: 2022-04-07